# Patient Record
Sex: FEMALE | Race: WHITE | NOT HISPANIC OR LATINO | Employment: OTHER | URBAN - METROPOLITAN AREA
[De-identification: names, ages, dates, MRNs, and addresses within clinical notes are randomized per-mention and may not be internally consistent; named-entity substitution may affect disease eponyms.]

---

## 2020-12-17 ENCOUNTER — APPOINTMENT (OUTPATIENT)
Dept: CT IMAGING | Facility: HOSPITAL | Age: 55
End: 2020-12-17

## 2020-12-17 ENCOUNTER — APPOINTMENT (OUTPATIENT)
Dept: GENERAL RADIOLOGY | Facility: HOSPITAL | Age: 55
End: 2020-12-17

## 2020-12-17 ENCOUNTER — HOSPITAL ENCOUNTER (EMERGENCY)
Facility: HOSPITAL | Age: 55
Discharge: NURSING, REHAB OR LONG-TERM CARE | End: 2020-12-17
Attending: EMERGENCY MEDICINE | Admitting: EMERGENCY MEDICINE

## 2020-12-17 VITALS
SYSTOLIC BLOOD PRESSURE: 119 MMHG | HEART RATE: 86 BPM | WEIGHT: 150 LBS | TEMPERATURE: 97.6 F | HEIGHT: 63 IN | OXYGEN SATURATION: 96 % | RESPIRATION RATE: 18 BRPM | BODY MASS INDEX: 26.58 KG/M2 | DIASTOLIC BLOOD PRESSURE: 73 MMHG

## 2020-12-17 DIAGNOSIS — R41.82 ALTERED MENTAL STATUS, UNSPECIFIED ALTERED MENTAL STATUS TYPE: ICD-10-CM

## 2020-12-17 DIAGNOSIS — R73.9 HYPERGLYCEMIA: Primary | ICD-10-CM

## 2020-12-17 LAB
ALBUMIN SERPL-MCNC: 3.5 G/DL (ref 3.5–5.2)
ALBUMIN/GLOB SERPL: 1 G/DL
ALP SERPL-CCNC: 118 U/L (ref 39–117)
ALT SERPL W P-5'-P-CCNC: 17 U/L (ref 1–33)
ANION GAP SERPL CALCULATED.3IONS-SCNC: 15.8 MMOL/L (ref 5–15)
AST SERPL-CCNC: 14 U/L (ref 1–32)
BACTERIA UR QL AUTO: NORMAL /HPF
BASOPHILS # BLD AUTO: 0.04 10*3/MM3 (ref 0–0.2)
BASOPHILS NFR BLD AUTO: 0.5 % (ref 0–1.5)
BILIRUB SERPL-MCNC: 0.2 MG/DL (ref 0–1.2)
BILIRUB UR QL STRIP: NEGATIVE
BUN SERPL-MCNC: 16 MG/DL (ref 6–20)
BUN/CREAT SERPL: 29.6 (ref 7–25)
CALCIUM SPEC-SCNC: 9.2 MG/DL (ref 8.6–10.5)
CHLORIDE SERPL-SCNC: 104 MMOL/L (ref 98–107)
CLARITY UR: ABNORMAL
CO2 SERPL-SCNC: 21.2 MMOL/L (ref 22–29)
COLOR UR: YELLOW
CREAT SERPL-MCNC: 0.54 MG/DL (ref 0.57–1)
DEPRECATED RDW RBC AUTO: 44.6 FL (ref 37–54)
EOSINOPHIL # BLD AUTO: 0.1 10*3/MM3 (ref 0–0.4)
EOSINOPHIL NFR BLD AUTO: 1.1 % (ref 0.3–6.2)
ERYTHROCYTE [DISTWIDTH] IN BLOOD BY AUTOMATED COUNT: 14.1 % (ref 12.3–15.4)
GFR SERPL CREATININE-BSD FRML MDRD: 118 ML/MIN/1.73
GLOBULIN UR ELPH-MCNC: 3.6 GM/DL
GLUCOSE SERPL-MCNC: 299 MG/DL (ref 65–99)
GLUCOSE UR STRIP-MCNC: ABNORMAL MG/DL
GRAN CASTS URNS QL MICRO: NORMAL /LPF
HCT VFR BLD AUTO: 40 % (ref 34–46.6)
HGB BLD-MCNC: 13 G/DL (ref 12–15.9)
HGB UR QL STRIP.AUTO: NEGATIVE
HOLD SPECIMEN: NORMAL
HOLD SPECIMEN: NORMAL
HYALINE CASTS UR QL AUTO: NORMAL /LPF
IMM GRANULOCYTES # BLD AUTO: 0.03 10*3/MM3 (ref 0–0.05)
IMM GRANULOCYTES NFR BLD AUTO: 0.3 % (ref 0–0.5)
KETONES UR QL STRIP: ABNORMAL
LEUKOCYTE ESTERASE UR QL STRIP.AUTO: NEGATIVE
LYMPHOCYTES # BLD AUTO: 1.98 10*3/MM3 (ref 0.7–3.1)
LYMPHOCYTES NFR BLD AUTO: 22.3 % (ref 19.6–45.3)
MAGNESIUM SERPL-MCNC: 2.2 MG/DL (ref 1.6–2.6)
MCH RBC QN AUTO: 28.3 PG (ref 26.6–33)
MCHC RBC AUTO-ENTMCNC: 32.5 G/DL (ref 31.5–35.7)
MCV RBC AUTO: 87 FL (ref 79–97)
MONOCYTES # BLD AUTO: 0.43 10*3/MM3 (ref 0.1–0.9)
MONOCYTES NFR BLD AUTO: 4.9 % (ref 5–12)
NEUTROPHILS NFR BLD AUTO: 6.28 10*3/MM3 (ref 1.7–7)
NEUTROPHILS NFR BLD AUTO: 70.9 % (ref 42.7–76)
NITRITE UR QL STRIP: NEGATIVE
NRBC BLD AUTO-RTO: 0 /100 WBC (ref 0–0.2)
PH UR STRIP.AUTO: 5.5 [PH] (ref 5–8)
PLATELET # BLD AUTO: 444 10*3/MM3 (ref 140–450)
PMV BLD AUTO: 10.2 FL (ref 6–12)
POTASSIUM SERPL-SCNC: 3.7 MMOL/L (ref 3.5–5.2)
PROCALCITONIN SERPL-MCNC: 0.05 NG/ML (ref 0–0.25)
PROT SERPL-MCNC: 7.1 G/DL (ref 6–8.5)
PROT UR QL STRIP: ABNORMAL
QT INTERVAL: 372 MS
RBC # BLD AUTO: 4.6 10*6/MM3 (ref 3.77–5.28)
RBC # UR: NORMAL /HPF
REF LAB TEST METHOD: NORMAL
SODIUM SERPL-SCNC: 141 MMOL/L (ref 136–145)
SP GR UR STRIP: >=1.03 (ref 1–1.03)
SQUAMOUS #/AREA URNS HPF: NORMAL /HPF
TROPONIN T SERPL-MCNC: <0.01 NG/ML (ref 0–0.03)
UROBILINOGEN UR QL STRIP: ABNORMAL
WBC # BLD AUTO: 8.86 10*3/MM3 (ref 3.4–10.8)
WBC UR QL AUTO: NORMAL /HPF
WHOLE BLOOD HOLD SPECIMEN: NORMAL
WHOLE BLOOD HOLD SPECIMEN: NORMAL

## 2020-12-17 PROCEDURE — 93005 ELECTROCARDIOGRAM TRACING: CPT | Performed by: EMERGENCY MEDICINE

## 2020-12-17 PROCEDURE — 72125 CT NECK SPINE W/O DYE: CPT

## 2020-12-17 PROCEDURE — 70450 CT HEAD/BRAIN W/O DYE: CPT

## 2020-12-17 PROCEDURE — 93010 ELECTROCARDIOGRAM REPORT: CPT | Performed by: INTERNAL MEDICINE

## 2020-12-17 PROCEDURE — 85025 COMPLETE CBC W/AUTO DIFF WBC: CPT | Performed by: EMERGENCY MEDICINE

## 2020-12-17 PROCEDURE — 84484 ASSAY OF TROPONIN QUANT: CPT | Performed by: EMERGENCY MEDICINE

## 2020-12-17 PROCEDURE — 71045 X-RAY EXAM CHEST 1 VIEW: CPT

## 2020-12-17 PROCEDURE — P9612 CATHETERIZE FOR URINE SPEC: HCPCS

## 2020-12-17 PROCEDURE — 84145 PROCALCITONIN (PCT): CPT | Performed by: EMERGENCY MEDICINE

## 2020-12-17 PROCEDURE — 99285 EMERGENCY DEPT VISIT HI MDM: CPT

## 2020-12-17 PROCEDURE — 96360 HYDRATION IV INFUSION INIT: CPT

## 2020-12-17 PROCEDURE — 83735 ASSAY OF MAGNESIUM: CPT | Performed by: EMERGENCY MEDICINE

## 2020-12-17 PROCEDURE — 96361 HYDRATE IV INFUSION ADD-ON: CPT

## 2020-12-17 PROCEDURE — 81001 URINALYSIS AUTO W/SCOPE: CPT | Performed by: EMERGENCY MEDICINE

## 2020-12-17 PROCEDURE — 80053 COMPREHEN METABOLIC PANEL: CPT | Performed by: EMERGENCY MEDICINE

## 2020-12-17 RX ORDER — LOPERAMIDE HYDROCHLORIDE 2 MG/1
4 CAPSULE ORAL AS NEEDED
Status: ON HOLD | COMMUNITY
End: 2021-03-14

## 2020-12-17 RX ORDER — ACETAMINOPHEN 160 MG/5ML
650 SOLUTION ORAL EVERY 8 HOURS PRN
Status: ON HOLD | COMMUNITY
End: 2021-03-14

## 2020-12-17 RX ORDER — INSULIN GLARGINE 100 [IU]/ML
35 INJECTION, SOLUTION SUBCUTANEOUS 2 TIMES DAILY
COMMUNITY

## 2020-12-17 RX ORDER — CEPHALEXIN 250 MG/1
250 CAPSULE ORAL 3 TIMES DAILY
COMMUNITY
Start: 2020-12-16 | End: 2020-12-22

## 2020-12-17 RX ORDER — BROMOCRIPTINE MESYLATE 5 MG/1
15 CAPSULE ORAL 2 TIMES DAILY
Status: ON HOLD | COMMUNITY
End: 2021-03-14

## 2020-12-17 RX ORDER — CHLORHEXIDINE GLUCONATE 0.12 MG/ML
15 RINSE ORAL 2 TIMES DAILY
COMMUNITY

## 2020-12-17 RX ORDER — BACLOFEN 20 MG/1
10 TABLET ORAL 4 TIMES DAILY
COMMUNITY

## 2020-12-17 RX ORDER — ESCITALOPRAM OXALATE 20 MG/1
20 TABLET ORAL DAILY
COMMUNITY

## 2020-12-17 RX ORDER — SODIUM CHLORIDE 0.9 % (FLUSH) 0.9 %
10 SYRINGE (ML) INJECTION AS NEEDED
Status: DISCONTINUED | OUTPATIENT
Start: 2020-12-17 | End: 2020-12-17 | Stop reason: HOSPADM

## 2020-12-17 RX ORDER — FAMOTIDINE 20 MG/1
20 TABLET, FILM COATED ORAL EVERY MORNING
COMMUNITY

## 2020-12-17 RX ORDER — DIMETHICONE 1 G/100ML
LOTION TOPICAL 2 TIMES DAILY PRN
COMMUNITY

## 2020-12-17 RX ADMIN — SODIUM CHLORIDE, POTASSIUM CHLORIDE, SODIUM LACTATE AND CALCIUM CHLORIDE 1000 ML: 600; 310; 30; 20 INJECTION, SOLUTION INTRAVENOUS at 15:25

## 2020-12-17 NOTE — ED NOTES
Pt presents to ED via EMS from [facility]. Facility report she had a shut placed for hydrocephalus last month, and has had an increase in lethergy and weakness since the surgery. Pt baseline mental status A&Ox2, does not ambulate, and in a mask at this time.      Uriel Carlton, RN  12/17/20 8280

## 2020-12-17 NOTE — PROGRESS NOTES
Clinical Pharmacy Services: Medication History    Charu Schroeder is a 55 y.o. female presenting to [facility] for   Chief Complaint   Patient presents with   • Weakness - Generalized       She  has no past medical history on file.    Allergies as of 12/17/2020   • (No Known Allergies)       Medication information was obtained from: longterm paperwork  Pharmacy and Phone Number:     Prior to Admission Medications     Prescriptions Last Dose Informant Patient Reported? Taking?    acetaminophen (TYLENOL) 160 MG/5ML solution  Nursing Home Yes Yes    650 mg by Per G Tube route Every 8 (Eight) Hours As Needed for Mild Pain .    amantadine (SYMMETREL) 50 MG/5ML solution  Nursing Home Yes Yes    100 mg by Per G Tube route Every 12 (Twelve) Hours.    baclofen (LIORESAL) 20 MG tablet  Nursing Home Yes Yes    20 mg by Per G Tube route 3 (Three) Times a Day.    bromocriptine (PARLODEL) 5 MG capsule  Nursing Home Yes Yes    15 mg by Per G Tube route 2 (two) times a day.    cephalexin (KEFLEX) 250 MG capsule  Nursing Home Yes Yes    250 mg by Per G Tube route 3 (Three) Times a Day.    chlorhexidine (PERIDEX) 0.12 % solution  Nursing Home Yes Yes    Apply 15 mL to the mouth or throat 2 (Two) Times a Day.    dimethicone (Remedy Nutrashield) 1 % cream  Nursing Home Yes Yes    Apply  topically to the appropriate area as directed 2 (Two) Times a Day As Needed for Dry Skin. Apply to buttock    escitalopram (LEXAPRO) 20 MG tablet  Nursing Home Yes Yes    20 mg by Per G Tube route Daily.    famotidine (PEPCID) 20 MG tablet  Nursing Home Yes Yes    20 mg by Per G Tube route Every Morning.    insulin aspart (novoLOG FLEXPEN) 100 UNIT/ML solution pen-injector sc pen  Nursing Home Yes Yes    Inject 0-18 Units under the skin into the appropriate area as directed 4 (Four) Times a Day Before Meals & at Bedtime. For 150-200 (3 units), 201-250 (6 units), 251-300 (9 units), 301-350 (12 units), 351-400 (15 units), 401-450 (18  units) over 450 call MD    Insulin Glargine (BASAGLAR KWIKPEN) 100 UNIT/ML injection pen  Nursing Home Yes Yes    Inject 35 Units under the skin into the appropriate area as directed 2 (Two) Times a Day.    loperamide (IMODIUM) 2 MG capsule  Nursing Home Yes Yes    4 mg by Per G Tube route As Needed for Diarrhea. Maximum six capsules per day    metFORMIN (GLUCOPHAGE) 1000 MG tablet  Nursing Home Yes Yes    1,000 mg by Per G Tube route 2 (Two) Times a Day With Meals.    metoprolol tartrate (LOPRESSOR) 25 MG tablet  Nursing Home Yes Yes    6.25 mg by Per G Tube route 2 (Two) Times a Day.    miconazole (MICOTIN) 2 % powder  Nursing Home Yes Yes    Apply  topically to the appropriate area as directed 2 (two) times a day. Apply to groin and under bilateral breast    mupirocin (BACTROBAN) 2 % ointment  Nursing Home Yes Yes    Apply  topically to the appropriate area as directed 2 (Two) Times a Day. Apply to top of right hand and between fingers            Medication notes:     This medication list is complete to the best of my knowledge as of 12/17/2020    Please call if questions.    Keara Bell [facility]  Medication History Technician  [phone]    12/17/2020 18:03 EST

## 2020-12-17 NOTE — ED PROVIDER NOTES
EMERGENCY DEPARTMENT ENCOUNTER    Room Number:  15/15  Date of encounter:  12/17/2020  PCP: Germán Freire MD  Historian: Nursing home nurse and nursing home records    I used full protective equipment while examining this patient.  This includes face mask, gloves and protective eyewear.  I washed my hands before entering the room and immediately upon leaving the room.  Patient was wearing a surgical mask.      HPI:  Chief Complaint: Altered mental status  A complete HPI/ROS/PMH/PSH/SH/FH are unobtainable due to: Altered mental status    Context: Charu Schroeder is a 55 y.o. female who presents to the ED from [facility] with reports of altered mental status for the past several days.  Patient has reportedly had increased confusion and increased lethargy.  Judi, the patient's nurse at the rehab facility, states that the patient had a  shunt placed within the past month.  She believes this was done at a hospital in [place].  She states that the doctor at the facility wanted the patient sent to the ED for evaluation.  There is no history of recent fever, cough, or vomiting.  History is limited secondary to altered mental status.      PAST MEDICAL HISTORY  Active Ambulatory Problems     Diagnosis Date Noted   • No Active Ambulatory Problems     Resolved Ambulatory Problems     Diagnosis Date Noted   • No Resolved Ambulatory Problems     No Additional Past Medical History         PAST SURGICAL HISTORY  History reviewed. No pertinent surgical history.      FAMILY HISTORY  No family history on file.      SOCIAL HISTORY  Social History     Socioeconomic History   • Marital status:      Spouse name: Not on file   • Number of children: Not on file   • Years of education: Not on file   • Highest education level: Not on file         ALLERGIES  Patient has no known allergies.       REVIEW OF SYSTEMS  Review of Systems   Unobtainable      PHYSICAL EXAM    I have reviewed the triage vital signs  and nursing notes.    ED Triage Vitals [12/17/20 1418]   Temp Heart Rate Resp BP SpO2   97.6 °F (36.4 °C) 81 16 137/79 98 %      Temp src Heart Rate Source Patient Position BP Location FiO2 (%)   -- Monitor Sitting Right arm --       Physical Exam  GENERAL: Eyes open, will answer questions or follow commands  HENT: NCAT, nares patent, dry mucous membranes  NECK: supple  EYES: no scleral icterus  CV: regular rhythm, regular rate, no murmur  RESPIRATORY: normal effort, clear to auscultation bilaterally  ABDOMEN: soft, nontender, G-tube in place  MUSCULOSKELETAL: Contractures of the right arm  NEURO: Right hemiplegia, withdraws left arm to painful stimuli  SKIN: warm, dry, no rash        LAB RESULTS  Recent Results (from the past 24 hour(s))   ECG 12 Lead    Collection Time: 12/17/20  3:17 PM   Result Value Ref Range    QT Interval 372 ms   Comprehensive Metabolic Panel    Collection Time: 12/17/20  3:24 PM    Specimen: Blood   Result Value Ref Range    Glucose 299 (H) 65 - 99 mg/dL    BUN 16 6 - 20 mg/dL    Creatinine 0.54 (L) 0.57 - 1.00 mg/dL    Sodium 141 136 - 145 mmol/L    Potassium 3.7 3.5 - 5.2 mmol/L    Chloride 104 98 - 107 mmol/L    CO2 21.2 (L) 22.0 - 29.0 mmol/L    Calcium 9.2 8.6 - 10.5 mg/dL    Total Protein 7.1 6.0 - 8.5 g/dL    Albumin 3.50 3.50 - 5.20 g/dL    ALT (SGPT) 17 1 - 33 U/L    AST (SGOT) 14 1 - 32 U/L    Alkaline Phosphatase 118 (H) 39 - 117 U/L    Total Bilirubin 0.2 0.0 - 1.2 mg/dL    eGFR Non African Amer 118 >60 mL/min/1.73    Globulin 3.6 gm/dL    A/G Ratio 1.0 g/dL    BUN/Creatinine Ratio 29.6 (H) 7.0 - 25.0    Anion Gap 15.8 (H) 5.0 - 15.0 mmol/L   Troponin    Collection Time: 12/17/20  3:24 PM    Specimen: Blood   Result Value Ref Range    Troponin T <0.010 0.000 - 0.030 ng/mL   Magnesium    Collection Time: 12/17/20  3:24 PM    Specimen: Blood   Result Value Ref Range    Magnesium 2.2 1.6 - 2.6 mg/dL   Light Blue Top    Collection Time: 12/17/20  3:24 PM   Result Value Ref Range     Extra Tube hold for add-on    Green Top (Gel)    Collection Time: 12/17/20  3:24 PM   Result Value Ref Range    Extra Tube Hold for add-ons.    Lavender Top    Collection Time: 12/17/20  3:24 PM   Result Value Ref Range    Extra Tube hold for add-on    Gold Top - SST    Collection Time: 12/17/20  3:24 PM   Result Value Ref Range    Extra Tube Hold for add-ons.    CBC Auto Differential    Collection Time: 12/17/20  3:24 PM    Specimen: Blood   Result Value Ref Range    WBC 8.86 3.40 - 10.80 10*3/mm3    RBC 4.60 3.77 - 5.28 10*6/mm3    Hemoglobin 13.0 12.0 - 15.9 g/dL    Hematocrit 40.0 34.0 - 46.6 %    MCV 87.0 79.0 - 97.0 fL    MCH 28.3 26.6 - 33.0 pg    MCHC 32.5 31.5 - 35.7 g/dL    RDW 14.1 12.3 - 15.4 %    RDW-SD 44.6 37.0 - 54.0 fl    MPV 10.2 6.0 - 12.0 fL    Platelets 444 140 - 450 10*3/mm3    Neutrophil % 70.9 42.7 - 76.0 %    Lymphocyte % 22.3 19.6 - 45.3 %    Monocyte % 4.9 (L) 5.0 - 12.0 %    Eosinophil % 1.1 0.3 - 6.2 %    Basophil % 0.5 0.0 - 1.5 %    Immature Grans % 0.3 0.0 - 0.5 %    Neutrophils, Absolute 6.28 1.70 - 7.00 10*3/mm3    Lymphocytes, Absolute 1.98 0.70 - 3.10 10*3/mm3    Monocytes, Absolute 0.43 0.10 - 0.90 10*3/mm3    Eosinophils, Absolute 0.10 0.00 - 0.40 10*3/mm3    Basophils, Absolute 0.04 0.00 - 0.20 10*3/mm3    Immature Grans, Absolute 0.03 0.00 - 0.05 10*3/mm3    nRBC 0.0 0.0 - 0.2 /100 WBC   Procalcitonin    Collection Time: 12/17/20  3:24 PM    Specimen: Blood   Result Value Ref Range    Procalcitonin 0.05 0.00 - 0.25 ng/mL   Urinalysis With Microscopic If Indicated (No Culture) - Urine, Catheter    Collection Time: 12/17/20  3:56 PM    Specimen: Urine, Catheter   Result Value Ref Range    Color, UA Yellow Yellow, Straw    Appearance, UA Cloudy (A) Clear    pH, UA 5.5 5.0 - 8.0    Specific Gravity, UA >=1.030 1.005 - 1.030    Glucose, UA >=1000 mg/dL (3+) (A) Negative    Ketones, UA Trace (A) Negative    Bilirubin, UA Negative Negative    Blood, UA Negative Negative    Protein,  UA 30 mg/dL (1+) (A) Negative    Leuk Esterase, UA Negative Negative    Nitrite, UA Negative Negative    Urobilinogen, UA 1.0 E.U./dL 0.2 - 1.0 E.U./dL   Urinalysis, Microscopic Only - Urine, Catheter    Collection Time: 12/17/20  3:56 PM    Specimen: Urine, Catheter   Result Value Ref Range    RBC, UA 0-2 None Seen, 0-2 /HPF    WBC, UA 0-2 None Seen, 0-2 /HPF    Bacteria, UA None Seen None Seen /HPF    Squamous Epithelial Cells, UA None Seen None Seen, 0-2 /HPF    Hyaline Casts, UA None Seen None Seen /LPF    Granular Casts, UA 0-2 None Seen /LPF    Methodology Manual Light Microscopy        Ordered the above labs and independently reviewed the results.      RADIOLOGY  Ct Head Without Contrast    Result Date: 12/17/2020  CT HEAD WITHOUT CONTRAST  HISTORY: Altered mental status, lethargy.  COMPARISON: None.  FINDINGS: The patient's head is canted in the scanner. A small stefan hole is noted involving the frontal bone superolaterally to the right.  There is at least moderate enlargement of the lateral and third ventricles. There is no evidence of focal herniation or of a focal area of decreased attenuation to suggest acute infarction. Areas of decreased attenuation are noted involving the periventrical white matter of cerebral hemispheres bilaterally. This may represent small vessel ischemic disease and transependymal migration of fluid.  There is marked rotatory subluxation at C1-C2. This is only partially visualized.      No evidence of hemorrhage or of acute infarction. There is at least moderate enlargement of lateral and third ventricles. The fourth ventricle is not enlarged. Rotatory subluxation of C1-C2 is noted, only partially visualized.  The above information was called to and discussed with Dr. Mercedes.  CHECK CHECK recons.    Radiation dose reduction techniques were utilized, including automated exposure control and exposure modulation based on body size.       Ct Cervical Spine Without Contrast    Result  Date: 12/17/2020  CT CERVICAL SPINE WO CONTRAST-  INDICATIONS: C1-C2 rotatory subluxation  Radiation dose reduction techniques were utilized, including automated exposure control and exposure modulation based on body size.  TECHNIQUE: Noncontrast CT of the cervical spine  COMPARISON: Correlated with head CT from 12/17/2020  FINDINGS:  No acute fracture is identified.  Previously noted rotatory subluxation of C1 on C2 is redemonstrated, that may reflect ligamentous injury or degenerative ligamentous laxity of unknown chronicity, correlate with clinical presentation (if further characterization is indicated, MRI could be considered). For example, on sagittal image 94, the posterior margin of the right C1 facet is located 8.6 mm anterior to the posterior margin of the right C2 facet. Likewise, on sagittal image 125, the posterior margin of the left C1 facet located 7.0 mm posterior to the posterior margin of the left C2 facet. Mild anterolisthesis of C2 on C3. Alignment is otherwise in range of normal. The C2-C3 facets are fused bilaterally.  Facet and uncovertebral joint hypertrophy contribute to bilateral neuroforaminal narrowing, more conspicuous at C5/6, and to a lesser degree C4/5.  Disc osteophyte complex appears to result in mild central stenosis at C4/5, C5/6, C6/7.  A mildly prominent right jugulodigastric lymph node, 2.2 cm, sagittal image 26 is nonspecific, could be reactive in nature or potentially evidence of neoplasm, clinical correlation and interval follow-up recommended to characterize change; if further imaging evaluation is clinically indicated, positron emission tomography could be considered.  Small right cervical carotid arterial calcification.         As described.  This report was finalized on 12/17/2020 6:19 PM by Dr. Dylan Pena M.D.      Xr Chest 1 View    Result Date: 12/17/2020  XR CHEST 1 VW-  HISTORY: Female who is 55 years-old,  weakness  TECHNIQUE: Frontal view of the chest   COMPARISON: None available  FINDINGS: Borderline heart size. Pulmonary vasculature is unremarkable. These tortuous. Lung volume is low with small atelectasis or infiltrate at the right lung base. No focal pleural effusion or pneumothorax. No acute osseous process.      Lung volume is low with small atelectasis or infiltrate at the right lung base. Borderline heart size. Tortuous aorta.  This report was finalized on 12/17/2020 3:26 PM by Dr. Dylan Pena M.D.        I ordered the above noted radiological studies. Reviewed by me and discussed with radiologist.  See dictation for official radiology interpretation.      PROCEDURES  Procedures      MEDICATIONS GIVEN IN ER    Medications   sodium chloride 0.9 % flush 10 mL (has no administration in time range)   lactated ringers bolus 1,000 mL (0 mL Intravenous Stopped 12/17/20 1823)         PROGRESS, DATA ANALYSIS, CONSULTS, AND MEDICAL DECISION MAKING    All labs have been independently reviewed by me.  All radiology studies have been reviewed by me and discussed with radiologist dictating the report.   EKG's independently viewed and interpreted by me.  I have reviewed the nurse's notes, vital signs, past medical history, and medication list.  Discussion below represents my analysis of pertinent findings related to patient's condition, differential diagnosis, treatment plan and final disposition.      ED Course as of Dec 17 2116   Thu Dec 17, 2020   1505 Old records reviewed.  Patient has no prior admissions or ED visits here.    [WH]   1519 EKG          EKG time: 1517  Rhythm/Rate: Sinus rhythm, rate 96 (artifact present in multiple leads which limits interpretation)  P waves and MN: Normal  QRS, axis: Normal  ST and T waves: Normal    Interpreted Contemporaneously by me, independently viewed  No prior available for comparison       [WH]   1644 Results of the head CT discussed with Dr. Mallory (radiologist).  Images independently viewed by me.  There is enlargement  of the lateral and third ventricles.  There is no shunt present.  There is evidence of a prior stefan hole.  No hemorrhage.  There appears to be some rotary subluxation of the upper cervical spine.  CT of the C-spine recommended for further evaluation.    [WH]   1827 CT of the cervical spine has been interpreted by the radiologist.  I suspect these findings are chronic as there were no reports of recent trauma.    [WH]   1853 Patient is sleeping.  Vital signs are normal.  Patient's work-up is been unremarkable.  Head CT was negative acute.  She does not have UTI.  Electrolytes are normal.  Patient will be discharged back to the nursing home.    [WH]      ED Course User Index  [WH] Bautista Mercedes MD       AS OF 21:16 EST VITALS:    BP - 119/73  HR - 86  TEMP - 97.6 °F (36.4 °C)  O2 SATS - 96%      DIAGNOSIS  Final diagnoses:   Hyperglycemia   Altered mental status, unspecified altered mental status type         DISPOSITION  Discharge    Dictated utilizing Dragon dictation:  Much of this encounter note is an electronic transcription/translation of spoken language to printed text. The electronic translation of spoken language may permit erroneous, or at times, nonsensical words or phrases to be inadvertently transcribed; Although I have reviewed the note for such errors, some may still exist.     Bautista Mercedes MD  12/17/20 8519

## 2020-12-18 NOTE — DISCHARGE INSTRUCTIONS
Follow-up with your primary care doctor soon as possible.  Return to the emergency department for shortness of breath, fever, vomiting, or other concern.

## 2020-12-18 NOTE — PROGRESS NOTES
[facility] notified that patient needs to be transported back to [facility].  Awaiting call back for estimated time of transport.

## 2021-03-14 ENCOUNTER — APPOINTMENT (OUTPATIENT)
Dept: CT IMAGING | Facility: HOSPITAL | Age: 56
End: 2021-03-14

## 2021-03-14 ENCOUNTER — APPOINTMENT (OUTPATIENT)
Dept: GENERAL RADIOLOGY | Facility: HOSPITAL | Age: 56
End: 2021-03-14

## 2021-03-14 ENCOUNTER — HOSPITAL ENCOUNTER (INPATIENT)
Facility: HOSPITAL | Age: 56
LOS: 3 days | Discharge: NURSING, REHAB OR LONG-TERM CARE | End: 2021-03-17
Attending: EMERGENCY MEDICINE | Admitting: HOSPITALIST

## 2021-03-14 DIAGNOSIS — E87.0 HYPERNATREMIA: ICD-10-CM

## 2021-03-14 DIAGNOSIS — A41.9 SEPSIS, DUE TO UNSPECIFIED ORGANISM, UNSPECIFIED WHETHER ACUTE ORGAN DYSFUNCTION PRESENT (HCC): Primary | ICD-10-CM

## 2021-03-14 DIAGNOSIS — E86.0 DEHYDRATION: ICD-10-CM

## 2021-03-14 DIAGNOSIS — R09.02 HYPOXIC: ICD-10-CM

## 2021-03-14 DIAGNOSIS — R73.9 HYPERGLYCEMIA: ICD-10-CM

## 2021-03-14 DIAGNOSIS — Z87.820 HX OF TRAUMATIC BRAIN INJURY: ICD-10-CM

## 2021-03-14 PROBLEM — I10 ESSENTIAL HYPERTENSION: Status: ACTIVE | Noted: 2021-03-14

## 2021-03-14 PROBLEM — E83.41 HYPERMAGNESEMIA: Status: ACTIVE | Noted: 2021-03-14

## 2021-03-14 PROBLEM — E11.65 TYPE 2 DIABETES MELLITUS WITH HYPERGLYCEMIA (HCC): Status: ACTIVE | Noted: 2021-03-14

## 2021-03-14 PROBLEM — I69.051: Status: ACTIVE | Noted: 2021-03-14

## 2021-03-14 PROBLEM — E83.42 HYPOMAGNESEMIA: Status: ACTIVE | Noted: 2021-03-14

## 2021-03-14 PROBLEM — N17.9 AKI (ACUTE KIDNEY INJURY) (HCC): Status: ACTIVE | Noted: 2021-03-14

## 2021-03-14 LAB
ALBUMIN SERPL-MCNC: 3.5 G/DL (ref 3.5–5.2)
ALBUMIN/GLOB SERPL: 1 G/DL
ALP SERPL-CCNC: 105 U/L (ref 39–117)
ALT SERPL W P-5'-P-CCNC: 15 U/L (ref 1–33)
ANION GAP SERPL CALCULATED.3IONS-SCNC: 11.2 MMOL/L (ref 5–15)
ANION GAP SERPL CALCULATED.3IONS-SCNC: 11.5 MMOL/L (ref 5–15)
ANION GAP SERPL CALCULATED.3IONS-SCNC: 16.9 MMOL/L (ref 5–15)
AST SERPL-CCNC: 15 U/L (ref 1–32)
ATMOSPHERIC PRESS: 759.1 MMHG
B PARAPERT DNA SPEC QL NAA+PROBE: NOT DETECTED
B PERT DNA SPEC QL NAA+PROBE: NOT DETECTED
B-OH-BUTYR SERPL-SCNC: 0.55 MMOL/L (ref 0.02–0.27)
BACTERIA UR QL AUTO: ABNORMAL /HPF
BASE EXCESS BLDV CALC-SCNC: 0.7 MMOL/L (ref -2–2)
BASOPHILS # BLD AUTO: 0.05 10*3/MM3 (ref 0–0.2)
BASOPHILS NFR BLD AUTO: 0.5 % (ref 0–1.5)
BDY SITE: ABNORMAL
BILIRUB SERPL-MCNC: 0.2 MG/DL (ref 0–1.2)
BILIRUB UR QL STRIP: NEGATIVE
BUN SERPL-MCNC: 35 MG/DL (ref 6–20)
BUN SERPL-MCNC: 46 MG/DL (ref 6–20)
BUN SERPL-MCNC: 54 MG/DL (ref 6–20)
BUN/CREAT SERPL: 53.8 (ref 7–25)
BUN/CREAT SERPL: 54.5 (ref 7–25)
BUN/CREAT SERPL: 58.2 (ref 7–25)
C PNEUM DNA NPH QL NAA+NON-PROBE: NOT DETECTED
CALCIUM SPEC-SCNC: 8 MG/DL (ref 8.6–10.5)
CALCIUM SPEC-SCNC: 8.1 MG/DL (ref 8.6–10.5)
CALCIUM SPEC-SCNC: 8.7 MG/DL (ref 8.6–10.5)
CHLORIDE SERPL-SCNC: 120 MMOL/L (ref 98–107)
CHLORIDE SERPL-SCNC: 124 MMOL/L (ref 98–107)
CHLORIDE SERPL-SCNC: 126 MMOL/L (ref 98–107)
CLARITY UR: ABNORMAL
CO2 SERPL-SCNC: 25.1 MMOL/L (ref 22–29)
CO2 SERPL-SCNC: 26.5 MMOL/L (ref 22–29)
CO2 SERPL-SCNC: 26.8 MMOL/L (ref 22–29)
COLOR UR: ABNORMAL
CREAT SERPL-MCNC: 0.65 MG/DL (ref 0.57–1)
CREAT SERPL-MCNC: 0.79 MG/DL (ref 0.57–1)
CREAT SERPL-MCNC: 0.99 MG/DL (ref 0.57–1)
D-LACTATE SERPL-SCNC: 3.3 MMOL/L (ref 0.5–2)
D-LACTATE SERPL-SCNC: 4.3 MMOL/L (ref 0.5–2)
DEPRECATED RDW RBC AUTO: 56.3 FL (ref 37–54)
EOSINOPHIL # BLD AUTO: 0 10*3/MM3 (ref 0–0.4)
EOSINOPHIL NFR BLD AUTO: 0 % (ref 0.3–6.2)
ERYTHROCYTE [DISTWIDTH] IN BLOOD BY AUTOMATED COUNT: 17.4 % (ref 12.3–15.4)
FLUAV SUBTYP SPEC NAA+PROBE: NOT DETECTED
FLUBV RNA ISLT QL NAA+PROBE: NOT DETECTED
GAS FLOW AIRWAY: 3 LPM
GFR SERPL CREATININE-BSD FRML MDRD: 58 ML/MIN/1.73
GFR SERPL CREATININE-BSD FRML MDRD: 76 ML/MIN/1.73
GFR SERPL CREATININE-BSD FRML MDRD: 95 ML/MIN/1.73
GLOBULIN UR ELPH-MCNC: 3.6 GM/DL
GLUCOSE BLDC GLUCOMTR-MCNC: 311 MG/DL (ref 70–130)
GLUCOSE BLDC GLUCOMTR-MCNC: 312 MG/DL (ref 70–130)
GLUCOSE BLDC GLUCOMTR-MCNC: 440 MG/DL (ref 70–130)
GLUCOSE SERPL-MCNC: 363 MG/DL (ref 65–99)
GLUCOSE SERPL-MCNC: 368 MG/DL (ref 65–99)
GLUCOSE SERPL-MCNC: 469 MG/DL (ref 65–99)
GLUCOSE UR STRIP-MCNC: ABNORMAL MG/DL
HADV DNA SPEC NAA+PROBE: NOT DETECTED
HCO3 BLDV-SCNC: 26.2 MMOL/L (ref 22–28)
HCOV 229E RNA SPEC QL NAA+PROBE: NOT DETECTED
HCOV HKU1 RNA SPEC QL NAA+PROBE: NOT DETECTED
HCOV NL63 RNA SPEC QL NAA+PROBE: NOT DETECTED
HCOV OC43 RNA SPEC QL NAA+PROBE: NOT DETECTED
HCT VFR BLD AUTO: 51.5 % (ref 34–46.6)
HGB BLD-MCNC: 15.7 G/DL (ref 12–15.9)
HGB UR QL STRIP.AUTO: NEGATIVE
HMPV RNA NPH QL NAA+NON-PROBE: NOT DETECTED
HPIV1 RNA SPEC QL NAA+PROBE: NOT DETECTED
HPIV2 RNA SPEC QL NAA+PROBE: NOT DETECTED
HPIV3 RNA NPH QL NAA+PROBE: NOT DETECTED
HPIV4 P GENE NPH QL NAA+PROBE: NOT DETECTED
HYALINE CASTS UR QL AUTO: ABNORMAL /LPF
KETONES UR QL STRIP: ABNORMAL
LEUKOCYTE ESTERASE UR QL STRIP.AUTO: NEGATIVE
LYMPHOCYTES # BLD AUTO: 2.59 10*3/MM3 (ref 0.7–3.1)
LYMPHOCYTES NFR BLD AUTO: 26.4 % (ref 19.6–45.3)
M PNEUMO IGG SER IA-ACNC: NOT DETECTED
MAGNESIUM SERPL-MCNC: 2.6 MG/DL (ref 1.6–2.6)
MAGNESIUM SERPL-MCNC: 2.7 MG/DL (ref 1.6–2.6)
MCH RBC QN AUTO: 27.9 PG (ref 26.6–33)
MCHC RBC AUTO-ENTMCNC: 30.5 G/DL (ref 31.5–35.7)
MCV RBC AUTO: 91.5 FL (ref 79–97)
MODALITY: ABNORMAL
MONOCYTES # BLD AUTO: 0.53 10*3/MM3 (ref 0.1–0.9)
MONOCYTES NFR BLD AUTO: 5.4 % (ref 5–12)
NEUTROPHILS NFR BLD AUTO: 6.61 10*3/MM3 (ref 1.7–7)
NEUTROPHILS NFR BLD AUTO: 67.3 % (ref 42.7–76)
NITRITE UR QL STRIP: NEGATIVE
PCO2 BLDV: 44.1 MM HG (ref 41–51)
PH BLDV: 7.38 PH UNITS (ref 7.31–7.41)
PH UR STRIP.AUTO: <=5 [PH] (ref 5–8)
PLATELET # BLD AUTO: 247 10*3/MM3 (ref 140–450)
PMV BLD AUTO: 13.3 FL (ref 6–12)
PO2 BLDV: 55.9 MM HG (ref 35–45)
POTASSIUM SERPL-SCNC: 3.5 MMOL/L (ref 3.5–5.2)
POTASSIUM SERPL-SCNC: 3.7 MMOL/L (ref 3.5–5.2)
POTASSIUM SERPL-SCNC: 4.1 MMOL/L (ref 3.5–5.2)
PROCALCITONIN SERPL-MCNC: 0.13 NG/ML (ref 0–0.25)
PROT SERPL-MCNC: 7.1 G/DL (ref 6–8.5)
PROT UR QL STRIP: ABNORMAL
QT INTERVAL: 288 MS
RBC # BLD AUTO: 5.63 10*6/MM3 (ref 3.77–5.28)
RBC # UR: ABNORMAL /HPF
REF LAB TEST METHOD: ABNORMAL
RHINOVIRUS RNA SPEC NAA+PROBE: NOT DETECTED
RSV RNA NPH QL NAA+NON-PROBE: NOT DETECTED
SAO2 % BLDCOA: 87.9 % (ref 92–99)
SARS-COV-2 RNA NPH QL NAA+NON-PROBE: NOT DETECTED
SODIUM SERPL-SCNC: 162 MMOL/L (ref 136–145)
SODIUM SERPL-SCNC: 162 MMOL/L (ref 136–145)
SODIUM SERPL-SCNC: 164 MMOL/L (ref 136–145)
SP GR UR STRIP: >=1.03 (ref 1–1.03)
SQUAMOUS #/AREA URNS HPF: ABNORMAL /HPF
TOTAL RATE: 18 BREATHS/MINUTE
TROPONIN T SERPL-MCNC: 0.03 NG/ML (ref 0–0.03)
UROBILINOGEN UR QL STRIP: ABNORMAL
WBC # BLD AUTO: 9.82 10*3/MM3 (ref 3.4–10.8)
WBC UR QL AUTO: ABNORMAL /HPF
YEAST URNS QL MICRO: ABNORMAL /HPF

## 2021-03-14 PROCEDURE — 93010 ELECTROCARDIOGRAM REPORT: CPT | Performed by: INTERNAL MEDICINE

## 2021-03-14 PROCEDURE — P9612 CATHETERIZE FOR URINE SPEC: HCPCS

## 2021-03-14 PROCEDURE — 84145 PROCALCITONIN (PCT): CPT | Performed by: PHYSICIAN ASSISTANT

## 2021-03-14 PROCEDURE — 83605 ASSAY OF LACTIC ACID: CPT | Performed by: PHYSICIAN ASSISTANT

## 2021-03-14 PROCEDURE — 63710000001 INSULIN REGULAR HUMAN PER 5 UNITS: Performed by: PHYSICIAN ASSISTANT

## 2021-03-14 PROCEDURE — 82962 GLUCOSE BLOOD TEST: CPT

## 2021-03-14 PROCEDURE — 93005 ELECTROCARDIOGRAM TRACING: CPT | Performed by: PHYSICIAN ASSISTANT

## 2021-03-14 PROCEDURE — 81001 URINALYSIS AUTO W/SCOPE: CPT | Performed by: PHYSICIAN ASSISTANT

## 2021-03-14 PROCEDURE — 84484 ASSAY OF TROPONIN QUANT: CPT | Performed by: PHYSICIAN ASSISTANT

## 2021-03-14 PROCEDURE — 63710000001 INSULIN LISPRO (HUMAN) PER 5 UNITS: Performed by: HOSPITALIST

## 2021-03-14 PROCEDURE — 82803 BLOOD GASES ANY COMBINATION: CPT

## 2021-03-14 PROCEDURE — 83735 ASSAY OF MAGNESIUM: CPT | Performed by: PHYSICIAN ASSISTANT

## 2021-03-14 PROCEDURE — 82010 KETONE BODYS QUAN: CPT | Performed by: PHYSICIAN ASSISTANT

## 2021-03-14 PROCEDURE — 87070 CULTURE OTHR SPECIMN AEROBIC: CPT | Performed by: NURSE PRACTITIONER

## 2021-03-14 PROCEDURE — 85025 COMPLETE CBC W/AUTO DIFF WBC: CPT | Performed by: PHYSICIAN ASSISTANT

## 2021-03-14 PROCEDURE — 36415 COLL VENOUS BLD VENIPUNCTURE: CPT

## 2021-03-14 PROCEDURE — 71045 X-RAY EXAM CHEST 1 VIEW: CPT

## 2021-03-14 PROCEDURE — 0202U NFCT DS 22 TRGT SARS-COV-2: CPT | Performed by: EMERGENCY MEDICINE

## 2021-03-14 PROCEDURE — 83735 ASSAY OF MAGNESIUM: CPT | Performed by: NURSE PRACTITIONER

## 2021-03-14 PROCEDURE — 63710000001 INSULIN GLARGINE PER 5 UNITS: Performed by: NURSE PRACTITIONER

## 2021-03-14 PROCEDURE — 99285 EMERGENCY DEPT VISIT HI MDM: CPT

## 2021-03-14 PROCEDURE — 36415 COLL VENOUS BLD VENIPUNCTURE: CPT | Performed by: NURSE PRACTITIONER

## 2021-03-14 PROCEDURE — 70450 CT HEAD/BRAIN W/O DYE: CPT

## 2021-03-14 PROCEDURE — 87186 SC STD MICRODIL/AGAR DIL: CPT | Performed by: NURSE PRACTITIONER

## 2021-03-14 PROCEDURE — 80053 COMPREHEN METABOLIC PANEL: CPT | Performed by: NURSE PRACTITIONER

## 2021-03-14 PROCEDURE — 87205 SMEAR GRAM STAIN: CPT | Performed by: NURSE PRACTITIONER

## 2021-03-14 PROCEDURE — 87040 BLOOD CULTURE FOR BACTERIA: CPT | Performed by: PHYSICIAN ASSISTANT

## 2021-03-14 PROCEDURE — 87147 CULTURE TYPE IMMUNOLOGIC: CPT | Performed by: NURSE PRACTITIONER

## 2021-03-14 PROCEDURE — 25010000002 VANCOMYCIN 10 G RECONSTITUTED SOLUTION: Performed by: EMERGENCY MEDICINE

## 2021-03-14 PROCEDURE — 25010000002 PIPERACILLIN SOD-TAZOBACTAM PER 1 G: Performed by: EMERGENCY MEDICINE

## 2021-03-14 RX ORDER — ACETAMINOPHEN 325 MG/1
650 TABLET ORAL EVERY 4 HOURS PRN
Status: DISCONTINUED | OUTPATIENT
Start: 2021-03-14 | End: 2021-03-17 | Stop reason: HOSPADM

## 2021-03-14 RX ORDER — NITROGLYCERIN 0.4 MG/1
0.4 TABLET SUBLINGUAL
Status: DISCONTINUED | OUTPATIENT
Start: 2021-03-14 | End: 2021-03-17 | Stop reason: HOSPADM

## 2021-03-14 RX ORDER — NICOTINE POLACRILEX 4 MG
15 LOZENGE BUCCAL
Status: DISCONTINUED | OUTPATIENT
Start: 2021-03-14 | End: 2021-03-17 | Stop reason: HOSPADM

## 2021-03-14 RX ORDER — ACETAMINOPHEN 650 MG/1
650 SUPPOSITORY RECTAL ONCE
Status: COMPLETED | OUTPATIENT
Start: 2021-03-14 | End: 2021-03-14

## 2021-03-14 RX ORDER — DOCUSATE SODIUM 100 MG/1
100 CAPSULE, LIQUID FILLED ORAL 2 TIMES DAILY PRN
Status: DISCONTINUED | OUTPATIENT
Start: 2021-03-14 | End: 2021-03-17 | Stop reason: HOSPADM

## 2021-03-14 RX ORDER — CHOLECALCIFEROL (VITAMIN D3) 125 MCG
5 CAPSULE ORAL NIGHTLY PRN
Status: DISCONTINUED | OUTPATIENT
Start: 2021-03-14 | End: 2021-03-14

## 2021-03-14 RX ORDER — ESCITALOPRAM OXALATE 20 MG/1
20 TABLET ORAL DAILY
Status: DISCONTINUED | OUTPATIENT
Start: 2021-03-14 | End: 2021-03-17 | Stop reason: HOSPADM

## 2021-03-14 RX ORDER — FAMOTIDINE 20 MG/1
20 TABLET, FILM COATED ORAL EVERY MORNING
Status: DISCONTINUED | OUTPATIENT
Start: 2021-03-15 | End: 2021-03-17 | Stop reason: HOSPADM

## 2021-03-14 RX ORDER — SODIUM CHLORIDE 9 MG/ML
125 INJECTION, SOLUTION INTRAVENOUS CONTINUOUS
Status: DISCONTINUED | OUTPATIENT
Start: 2021-03-14 | End: 2021-03-14

## 2021-03-14 RX ORDER — ACETAMINOPHEN 160 MG/5ML
650 SOLUTION ORAL EVERY 4 HOURS PRN
Status: DISCONTINUED | OUTPATIENT
Start: 2021-03-14 | End: 2021-03-17 | Stop reason: HOSPADM

## 2021-03-14 RX ORDER — SODIUM CHLORIDE 9 MG/ML
100 INJECTION, SOLUTION INTRAVENOUS CONTINUOUS
Status: DISCONTINUED | OUTPATIENT
Start: 2021-03-14 | End: 2021-03-14

## 2021-03-14 RX ORDER — METHYLPHENIDATE HYDROCHLORIDE 20 MG/1
20 TABLET ORAL 2 TIMES DAILY
COMMUNITY

## 2021-03-14 RX ORDER — ACETAMINOPHEN 650 MG/1
650 SUPPOSITORY RECTAL EVERY 4 HOURS PRN
Status: DISCONTINUED | OUTPATIENT
Start: 2021-03-14 | End: 2021-03-17 | Stop reason: HOSPADM

## 2021-03-14 RX ORDER — INSULIN LISPRO 100 [IU]/ML
0-9 INJECTION, SOLUTION INTRAVENOUS; SUBCUTANEOUS
Status: DISCONTINUED | OUTPATIENT
Start: 2021-03-14 | End: 2021-03-14

## 2021-03-14 RX ORDER — INSULIN LISPRO 100 [IU]/ML
0-9 INJECTION, SOLUTION INTRAVENOUS; SUBCUTANEOUS EVERY 4 HOURS
Status: DISCONTINUED | OUTPATIENT
Start: 2021-03-14 | End: 2021-03-17 | Stop reason: HOSPADM

## 2021-03-14 RX ORDER — DEXTROSE AND SODIUM CHLORIDE 5; .45 G/100ML; G/100ML
125 INJECTION, SOLUTION INTRAVENOUS CONTINUOUS
Status: DISCONTINUED | OUTPATIENT
Start: 2021-03-14 | End: 2021-03-15

## 2021-03-14 RX ORDER — ONDANSETRON 2 MG/ML
4 INJECTION INTRAMUSCULAR; INTRAVENOUS EVERY 6 HOURS PRN
Status: DISCONTINUED | OUTPATIENT
Start: 2021-03-14 | End: 2021-03-17 | Stop reason: HOSPADM

## 2021-03-14 RX ORDER — INSULIN GLARGINE 100 [IU]/ML
35 INJECTION, SOLUTION SUBCUTANEOUS 2 TIMES DAILY
Status: DISCONTINUED | OUTPATIENT
Start: 2021-03-14 | End: 2021-03-17 | Stop reason: HOSPADM

## 2021-03-14 RX ORDER — DEXTROSE MONOHYDRATE 25 G/50ML
25 INJECTION, SOLUTION INTRAVENOUS
Status: DISCONTINUED | OUTPATIENT
Start: 2021-03-14 | End: 2021-03-17 | Stop reason: HOSPADM

## 2021-03-14 RX ORDER — SODIUM CHLORIDE 0.9 % (FLUSH) 0.9 %
10 SYRINGE (ML) INJECTION EVERY 12 HOURS SCHEDULED
Status: DISCONTINUED | OUTPATIENT
Start: 2021-03-14 | End: 2021-03-17 | Stop reason: HOSPADM

## 2021-03-14 RX ORDER — SODIUM CHLORIDE 0.9 % (FLUSH) 0.9 %
10 SYRINGE (ML) INJECTION AS NEEDED
Status: DISCONTINUED | OUTPATIENT
Start: 2021-03-14 | End: 2021-03-17 | Stop reason: HOSPADM

## 2021-03-14 RX ORDER — CALCIUM CARBONATE 200(500)MG
2 TABLET,CHEWABLE ORAL 2 TIMES DAILY PRN
Status: DISCONTINUED | OUTPATIENT
Start: 2021-03-14 | End: 2021-03-17 | Stop reason: HOSPADM

## 2021-03-14 RX ADMIN — SODIUM CHLORIDE 100 ML/HR: 9 INJECTION, SOLUTION INTRAVENOUS at 14:20

## 2021-03-14 RX ADMIN — METOPROLOL TARTRATE 6.25 MG: 25 TABLET, FILM COATED ORAL at 17:07

## 2021-03-14 RX ADMIN — ESCITALOPRAM 20 MG: 20 TABLET, FILM COATED ORAL at 17:07

## 2021-03-14 RX ADMIN — SODIUM CHLORIDE 1000 ML: 9 INJECTION, SOLUTION INTRAVENOUS at 08:58

## 2021-03-14 RX ADMIN — INSULIN LISPRO 7 UNITS: 100 INJECTION, SOLUTION INTRAVENOUS; SUBCUTANEOUS at 17:24

## 2021-03-14 RX ADMIN — SODIUM CHLORIDE 2040 ML: 9 INJECTION, SOLUTION INTRAVENOUS at 10:08

## 2021-03-14 RX ADMIN — INSULIN HUMAN 6 UNITS: 100 INJECTION, SOLUTION PARENTERAL at 09:11

## 2021-03-14 RX ADMIN — INSULIN LISPRO 7 UNITS: 100 INJECTION, SOLUTION INTRAVENOUS; SUBCUTANEOUS at 21:47

## 2021-03-14 RX ADMIN — TAZOBACTAM SODIUM AND PIPERACILLIN SODIUM 3.38 G: 375; 3 INJECTION, SOLUTION INTRAVENOUS at 10:10

## 2021-03-14 RX ADMIN — DEXTROSE AND SODIUM CHLORIDE 125 ML/HR: 5; 450 INJECTION, SOLUTION INTRAVENOUS at 17:06

## 2021-03-14 RX ADMIN — ACETAMINOPHEN ORAL SOLUTION 650 MG: 325 SOLUTION ORAL at 17:06

## 2021-03-14 RX ADMIN — VANCOMYCIN HYDROCHLORIDE 1250 MG: 10 INJECTION, POWDER, LYOPHILIZED, FOR SOLUTION INTRAVENOUS at 10:52

## 2021-03-14 RX ADMIN — SODIUM CHLORIDE, PRESERVATIVE FREE 10 ML: 5 INJECTION INTRAVENOUS at 20:37

## 2021-03-14 RX ADMIN — INSULIN GLARGINE 35 UNITS: 100 INJECTION, SOLUTION SUBCUTANEOUS at 21:47

## 2021-03-14 RX ADMIN — ACETAMINOPHEN 650 MG: 650 SUPPOSITORY RECTAL at 08:10

## 2021-03-14 RX ADMIN — AMANTADINE HYDROCHLORIDE 100 MG: 50 SOLUTION ORAL at 17:07

## 2021-03-14 RX ADMIN — METOPROLOL TARTRATE 6.25 MG: 25 TABLET, FILM COATED ORAL at 20:37

## 2021-03-14 RX ADMIN — SODIUM CHLORIDE, PRESERVATIVE FREE 10 ML: 5 INJECTION INTRAVENOUS at 17:07

## 2021-03-14 NOTE — ED PROVIDER NOTES
MD ATTESTATION NOTE  Patient was placed in face mask in first look and the following protective measures were taken unless additional measures were taken and documented below in the ED course. Patient was wearing facemask when I entered the room and throughout our encounter. I wore full protective equipment throughout this patient encounter including a face mask, and gloves. Hand hygiene was performed before donning protective equipment and after removal when leaving the room.    The TONY and I have discussed this patient's history, physical exam, and treatment plan. I have reviewed the documentation and personally had a face to face interaction with the patient. I affirm the TONY documentation and agree with their diagnostics, findings, treatment, plan, and disposition.  The attached note describes my personal findings.    Charu Schroeder is a 55 y.o. female who presents to the ED c/o fever tachycardia and tachypnea.  Patient has a history of subarachnoid hemorrhage, nonverbal at baseline.  History limited.  Patient sent from [place] ED secondary to abnormal vital signs, concern for infection.    On exam:  General: NAD  Head: NCAT  ENT: Extraocular motion intact, pupils equal and round reactive to light, moist mucous membranes  Neck: Supple, trachea midline  Cardiac: Tachycardic rate, regular rhythm  Lungs: Tachypneic, no accessory muscle use, crackles bilateral lung bases  Abdomen: Soft, nontender, no rebound tenderness/guarding/rigidity  : Deferred to TONY  Extremities: Moves all extremities well, no peripheral edema  Skin: Warm, dry    Medical Decision Making:  After the initial H&P, I discussed pertinent information from history and physical exam with patient/family.  Discussed differential diagnosis.  Discussed plan for ED evaluation/work-up/treatment.  All questions answered.  Patient/family is agreeable with plan.    ED Course as of Mar 14 1515   Sun Mar 14, 2021   4731 I wore full protective equipment  throughout this patient encounter including a CAPPR, gown and gloves. Hand hygiene was performed before donning protective equipment and after removal when leaving the room.        [JG]   0841 EKG independently viewed and contemporaneously interpreted by ED physician. Time: 8:24 AM.  Rate 136.  Interpretation: Sinus tachycardia, normal axis, normal QRS, no acute ST changes.    [JG]   0857 Corrected Sodium is 171.    [SS]   0907 I attempted to call [facility] 3x regarding the patient's CODE STATUS.  No answer.  We will try again later.    [SS]   0931 Febrile, lactic acid 4.3.  Initiating 30 cc/kg fluid bolus, will obtain repeat lactic acid.  Blood cultures obtained.  Chest x-ray unremarkable, urinalysis not consistent with infection.  No clear identified source.  Respiratory viral panel is currently pending.  Initiating broad-spectrum antibiotics for sepsis.    [JG]   1003 I had a lengthy discussion with Dr. Wilkinson regarding patient.  He reports that if  blood pressure is normal, I notified him of the hypernatremia, he advised to call medicine and will consult.    [SS]   1057 I discussed with Dr. Manley [facility] regarding patient.  He agrees to admit patient to telemetry and requests not to give any further antibiotics. RN had just started the Vancomycin but was discontinued.     [SS]   1103 I rechecked patient.  Blood pressure has remained stable and is 112/83 the patient is still tachycardic in the 120s.  I have admitted to a telemetry floor for further monitoring.    [SS]      ED Course User Index  [JG] Sim Hanson MD  [SS] Mary Lyon PA-C       Diagnosis  Final diagnoses:   Sepsis, due to unspecified organism, unspecified whether acute organ dysfunction present (CMS/[facility])   Dehydration   Hyperglycemia   Hx of traumatic brain injury   Hypoxic        Sim Hanson MD  03/14/21 8070

## 2021-03-14 NOTE — ED NOTES
All appropriate PPE worn during entire encounter with patient.        Aicha Mcgill, RN  03/14/21 0902

## 2021-03-14 NOTE — H&P
Patient Name:  Charu Schroeder  YOB: 1965  MRN:  [id number]  Admit Date:  3/14/2021  Patient Care Team:  Germán Freire MD as PCP - General (Physical Medicine and Rehabilitation)      Subjective   History Present Illness     Chief Complaint   Patient presents with   • Hyperglycemia       Ms. Schroeder is a 55 y.o. female with a history of traumatic subarachnoid hemorrhage and diabetes that presents to [facility] from [facility] for hyperglycemia, fever, tachypnea which began last night.  Blood sugar on arrival to the ED was greater than 400.  Patient's baseline is aphasic secondary to traumatic subarachnoid hemorrhage.  Apparently the patient has had a heart rate in the 150s with an elevated respiratory rate of 30 and was hypoxic on room air with sats in the 80s.  Staff at [facility] deny any recent Covid exposure in the facility.       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 No pertinent family history.  Social History     Tobacco Use   • Smoking status: Unknown If Ever Smoked   Substance Use Topics   • Alcohol use: Defer   • Drug use: Defer     No current facility-administered medications on  file prior to encounter.     Current Outpatient Medications on File Prior to Encounter   Medication Sig Dispense Refill   • amantadine (SYMMETREL) 50 MG/5ML solution 100 mg by Per G Tube route Every 12 (Twelve) Hours.     • baclofen (LIORESAL) 20 MG tablet 20 mg by Per G Tube route 3 (Three) Times a Day.     • chlorhexidine (PERIDEX) 0.12 % solution Apply 15 mL to the mouth or throat 2 (Two) Times a Day.     • dimethicone (Remedy Nutrashield) 1 % cream Apply  topically to the appropriate area as directed 2 (Two) Times a Day As Needed for Dry Skin. Apply to buttock     • escitalopram (LEXAPRO) 20 MG tablet 20 mg by Per G Tube route Daily.     • famotidine (PEPCID) 20 MG tablet 20 mg by Per G Tube route Every Morning.     • insulin aspart (novoLOG FLEXPEN) 100 UNIT/ML solution pen-injector sc pen Inject 0-18 Units under the skin into the appropriate area as directed 4 (Four) Times a Day Before Meals & at Bedtime. For 150-200 (3 units), 201-250 (6 units), 251-300 (9 units), 301-350 (12 units), 351-400 (15 units), 401-450 (18 units) over 450 call MD     • Insulin Glargine (BASAGLAR KWIKPEN) 100 UNIT/ML injection pen Inject 35 Units under the skin into the appropriate area as directed 2 (Two) Times a Day.     • metFORMIN (GLUCOPHAGE) 1000 MG tablet 1,000 mg by Per G Tube route 2 (Two) Times a Day With Meals.     • methylphenidate (RITALIN) 20 MG tablet 20 mg by Per G Tube route 2 (Two) Times a Day.     • metoprolol tartrate (LOPRESSOR) 25 MG tablet 6.25 mg by Per G Tube route 2 (Two) Times a Day.     • miconazole (MICOTIN) 2 % powder Apply  topically to the appropriate area as directed 2 (two) times a day. Apply to groin and under bilateral breast     • mupirocin (BACTROBAN) 2 % ointment Apply  topically to the appropriate area as directed 2 (Two) Times a Day. Apply to top of right hand and between fingers     • acetaminophen (TYLENOL) 160 MG/5ML solution 650 mg by Per G Tube route Every 8 (Eight) Hours As Needed for  Mild Pain .     • bromocriptine (PARLODEL) 5 MG capsule 15 mg by Per G Tube route 2 (two) times a day.     • loperamide (IMODIUM) 2 MG capsule 4 mg by Per G Tube route As Needed for Diarrhea. Maximum six capsules per day       No Known Allergies    Objective    Objective     Vital Signs  Temp:  [100 °F (37.8 °C)-101.2 °F (38.4 °C)] 100 °F (37.8 °C)  Heart Rate:  [130-140] 130  Resp:  [25-32] 25  BP: (112-124)/(66-85) 124/78  SpO2:  [90 %-94 %] 94 %  on  Flow (L/min):  [2] 2;   Device (Oxygen Therapy): nasal cannula  Body mass index is 26.57 kg/m².    Physical Exam  Constitutional:       Appearance: She is ill-appearing.   HENT:      Head: Normocephalic and atraumatic.      Nose: Nose normal.      Mouth/Throat:      Mouth: Mucous membranes are dry.   Eyes:      General:         Right eye: No discharge.         Left eye: No discharge.   Cardiovascular:      Rate and Rhythm: Tachycardia present.      Heart sounds: Normal heart sounds. No murmur.      Comments: Plus 2 pedal pulses  Pulmonary:      Comments: 2 liters NC with sats 93%  Abdominal:      General: Bowel sounds are normal.      Palpations: Abdomen is soft. There is no mass.      Comments: G tube site with thick brown drainage   Musculoskeletal:         General: Swelling (trace pedal edema) present.      Cervical back: Neck supple.   Skin:     General: Skin is warm and dry.      Coloration: Skin is pale.      Comments: Thin shiny skin on LE   Neurological:      GCS: GCS eye subscore is 1. GCS verbal subscore is 1. GCS motor subscore is 5.         Results Review:  I reviewed the patient's new clinical results.  I reviewed the patient's new imaging results and agree with the interpretation.  I reviewed the patient's other test results and agree with the interpretation  I have discussed the case with the ED provider.    Lab Results (last 24 hours)     Procedure Component Value Units Date/Time    CBC & Differential [[id number]]  (Abnormal) Collected: 03/14/21 0809     Specimen: Blood Updated: 03/14/21 0840    Narrative:      The following orders were created for panel order CBC & Differential.  Procedure                               Abnormality         Status                     ---------                               -----------         ------                     CBC Auto Differential[[id number]]        Abnormal            Final result                 Please view results for these tests on the individual orders.    Comprehensive Metabolic Panel [[id number]]  (Abnormal) Collected: 03/14/21 0809    Specimen: Blood Updated: 03/14/21 0857     Glucose 469 mg/dL      BUN 54 mg/dL      Creatinine 0.99 mg/dL      Sodium 162 mmol/L      Potassium 4.1 mmol/L      Chloride 120 mmol/L      CO2 25.1 mmol/L      Calcium 8.7 mg/dL      Total Protein 7.1 g/dL      Albumin 3.50 g/dL      ALT (SGPT) 15 U/L      AST (SGOT) 15 U/L      Alkaline Phosphatase 105 U/L      Total Bilirubin 0.2 mg/dL      eGFR Non African Amer 58 mL/min/1.73      Globulin 3.6 gm/dL      A/G Ratio 1.0 g/dL      BUN/Creatinine Ratio 54.5     Anion Gap 16.9 mmol/L     Narrative:      GFR Normal >60  Chronic Kidney Disease <60  Kidney Failure <15      Blood Culture - Blood, Blood, Venous Line [[id number]] Collected: 03/14/21 0809    Specimen: Blood, Venous Line Updated: 03/14/21 0818    Lactic Acid, Plasma [[id number]]  (Abnormal) Collected: 03/14/21 0809    Specimen: Blood Updated: 03/14/21 0842     Lactate 4.3 mmol/L     Procalcitonin [[id number]]  (Normal) Collected: 03/14/21 0809    Specimen: Blood Updated: 03/14/21 0849     Procalcitonin 0.13 ng/mL     Narrative:      As a Marker for Sepsis (Non-Neonates):   1. <0.5 ng/mL represents a low risk of severe sepsis and/or septic shock.  1. >2 ng/mL represents a high risk of severe sepsis and/or septic shock.    As a Marker for Lower Respiratory Tract Infections that require antibiotic therapy:  PCT on Admission     Antibiotic Therapy             6-12 Hrs later  > 0.5           "      Strongly Recommended            >0.25 - <0.5         Recommended  0.1 - 0.25           Discouraged                   Remeasure/reassess PCT  <0.1                 Strongly Discouraged          Remeasure/reassess PCT      As 28 day mortality risk marker: \"Change in Procalcitonin Result\" (> 80 % or <=80 %) if Day 0 (or Day 1) and Day 4 values are available. Refer to http://[email or web address]ano[facility]ordpct-calculator.com/   Change in PCT <=80 %   A decrease of PCT levels below or equal to 80 % defines a positive change in PCT test result representing a higher risk for 28-day all-cause mortality of patients diagnosed with severe sepsis or septic shock.  Change in PCT > 80 %   A decrease of PCT levels of more than 80 % defines a negative change in PCT result representing a lower risk for 28-day all-cause mortality of patients diagnosed with severe sepsis or septic shock.                Results may be falsely decreased if patient taking Biotin.     Magnesium [[id number]]  (Abnormal) Collected: 03/14/21 0809    Specimen: Blood Updated: 03/14/21 0844     Magnesium 2.7 mg/dL     CBC Auto Differential [[id number]]  (Abnormal) Collected: 03/14/21 0809    Specimen: Blood Updated: 03/14/21 0840     WBC 9.82 10*3/mm3      RBC 5.63 10*6/mm3      Hemoglobin 15.7 g/dL      Hematocrit 51.5 %      MCV 91.5 fL      MCH 27.9 pg      MCHC 30.5 g/dL      RDW 17.4 %      RDW-SD 56.3 fl      MPV 13.3 fL      Platelets 247 10*3/mm3      Neutrophil % 67.3 %      Lymphocyte % 26.4 %      Monocyte % 5.4 %      Eosinophil % 0.0 %      Basophil % 0.5 %      Neutrophils, Absolute 6.61 10*3/mm3      Lymphocytes, Absolute 2.59 10*3/mm3      Monocytes, Absolute 0.53 10*3/mm3      Eosinophils, Absolute 0.00 10*3/mm3      Basophils, Absolute 0.05 10*3/mm3     Ketone Bodies, Serum (Not performed at [place]) [[id number]]  (Abnormal) Collected: 03/14/21 0809    Specimen: Blood Updated: 03/14/21 0857    Narrative:      The following orders were created for panel order " Ketone Bodies, Serum (Not performed at [place]).  Procedure                               Abnormality         Status                     ---------                               -----------         ------                     Beta Hydroxybutyrate Arnulfo...[[id number]]  Abnormal            Final result                 Please view results for these tests on the individual orders.    Beta Hydroxybutyrate Quantitative [[id number]]  (Abnormal) Collected: 03/14/21 0809    Specimen: Blood Updated: 03/14/21 0857     Beta-Hydroxybutyrate Quant 0.550 mmol/L     Narrative:      In the assessment of possible diabetic ketoacidosis, the test should be interpreted along with other clinical and laboratory findings.  A level greater than 1 mmol/L should require further evaluation and levels of more than 3 mmol/L require immediate medical review.    Troponin [[id number]]  (Abnormal) Collected: 03/14/21 0809    Specimen: Blood Updated: 03/14/21 0843     Troponin T 0.032 ng/mL     Narrative:      Troponin T Reference Range:  <= 0.03 ng/mL-   Negative for AMI  >0.03 ng/mL-     Abnormal for myocardial necrosis.  Clinicians would have to utilize clinical acumen, EKG, Troponin and serial changes to determine if it is an Acute Myocardial Infarction or myocardial injury due to an underlying chronic condition.       Results may be falsely decreased if patient taking Biotin.      Urinalysis With Microscopic If Indicated (No Culture) - Urine, Catheter [[id number]]  (Abnormal) Collected: 03/14/21 0815    Specimen: Urine, Catheter Updated: 03/14/21 0845     Color, UA Dark Yellow     Appearance, UA Cloudy     pH, UA <=5.0     Specific Gravity, UA >=1.030     Glucose,  mg/dL (2+)     Ketones, UA 15 mg/dL (1+)     Bilirubin, UA Negative     Blood, UA Negative     Protein,  mg/dL (2+)     Leuk Esterase, UA Negative     Nitrite, UA Negative     Urobilinogen, UA 1.0 E.U./dL    Urinalysis, Microscopic Only - Urine, Catheter [[id number]]  (Abnormal)  Collected: 03/14/21 0815    Specimen: Urine, Catheter Updated: 03/14/21 0902     RBC, UA None Seen /HPF      WBC, UA None Seen /HPF      Bacteria, UA Trace /HPF      Squamous Epithelial Cells, UA 3-6 /HPF      Yeast, UA Large/3+ Budding Yeast /HPF      Hyaline Casts, UA None Seen /LPF      Methodology Manual Light Microscopy    Blood Culture - Blood, Arm, Left [[id number]] Collected: 03/14/21 0905    Specimen: Blood from Arm, Left Updated: 03/14/21 0910    Respiratory Panel PCR w/COVID-19(SARS-CoV-2) KIRSTIE/PATSY/TRIXIE/PAD/COR/MAD/RODRIGO In-House, NP Swab in UTM/VTM, 3-4 HR TAT - Swab, Nasopharynx [[id number]]  (Normal) Collected: 03/14/21 0906    Specimen: Swab from Nasopharynx Updated: 03/14/21 1009     ADENOVIRUS, PCR Not Detected     Coronavirus 229E Not Detected     Coronavirus HKU1 Not Detected     Coronavirus NL63 Not Detected     Coronavirus OC43 Not Detected     COVID19 Not Detected     Human Metapneumovirus Not Detected     Human Rhinovirus/Enterovirus Not Detected     Influenza A PCR Not Detected     Influenza B PCR Not Detected     Parainfluenza Virus 1 Not Detected     Parainfluenza Virus 2 Not Detected     Parainfluenza Virus 3 Not Detected     Parainfluenza Virus 4 Not Detected     RSV, PCR Not Detected     Bordetella pertussis pcr Not Detected     Bordetella parapertussis PCR Not Detected     Chlamydophila pneumoniae PCR Not Detected     Mycoplasma pneumo by PCR Not Detected    Narrative:      Fact sheet for providers: https://docs.Qapa/wp-content/uploads/MPK7352-2890-DN8.1-EUA-Provider-Fact-Sheet-3.pdf    Fact sheet for patients: https://docs.[employer]apa/wp-content/uploads/IVH2833-1234-PR3.1-EUA-Patient-Fact-Sheet-1.pdf    Test performed by PCR.    POC Glucose Once [[id number]]  (Abnormal) Collected: 03/14/21 1007    Specimen: Blood Updated: 03/14/21 1015     Glucose 440 mg/dL     Blood Gas, Venous - [[id number]]  (Abnormal) Collected: 03/14/21 1010    Specimen: Venous Blood Updated: 03/14/21 1013      Site N/A     pH, Venous 7.381 pH Units      pCO2, Venous 44.1 mm Hg      pO2, Venous 55.9 mm Hg      HCO3, Venous 26.2 mmol/L      Base Excess, Venous 0.7 mmol/L      O2 Saturation Calculated 87.9 %      Barometric Pressure for Blood Gas 759.1 mmHg      Modality Cannula     Flow Rate 3 lpm      Rate 18 Breaths/minute     Timed Lactic Acid, Reflex [[id number]]  (Abnormal) Collected: 03/14/21 1114    Specimen: Blood Updated: 03/14/21 1138     Lactate 3.3 mmol/L           Imaging Results (Last 24 Hours)     Procedure Component Value Units Date/Time    XR Chest 1 View [[id number]] Collected: 03/14/21 0856     Updated: 03/14/21 0900    Narrative:      XR CHEST 1 VW-     HISTORY: Female who is 55 years-old,  fever     TECHNIQUE: Frontal view of the chest     COMPARISON: 12/17/2020     FINDINGS: The heart size is borderline. Pulmonary vasculature is  unremarkable. Aorta is tortuous. No focal pulmonary consolidation,  pleural effusion, or pneumothorax. Right hemidiaphragm is mildly  elevated. Lung volume is low. No acute osseous process.       Impression:      No focal pulmonary consolidation. Borderline heart size.  Tortuous aorta. Follow-up as clinically indicated.     This report was finalized on 3/14/2021 8:57 AM by Dr. Dyaln Pena M.D.                 ECG 12 Lead   Final Result   HEART RATE= 136  bpm   RR Interval= 440  ms   IA Interval= 144  ms   P Horizontal Axis= -2  deg   P Front Axis= 49  deg   QRSD Interval= 69  ms   QT Interval= 288  ms   QRS Axis= 0  deg   T Wave Axis=   deg   - OTHERWISE NORMAL ECG -   Sinus tachycardia   No change from prior tracing   Electronically Signed By: Coco Beasley ([facility]) 14-Mar-2021 10:03:44   Date and Time of Study: 2021-03-14 08:24:55           Assessment/Plan     Active Hospital Problems    Diagnosis  POA   • **Type 2 diabetes mellitus with hyperglycemia (CMS/HCC) [E11.65]  Unknown   • Dehydration [E86.0]  Yes   • Sepsis, unspecified organism (CMS/HCC) [A41.9]  Unknown   •  ZAINAB (acute kidney injury) (CMS/HCC) [N17.9]  Unknown   • Hypernatremia [E87.0]  Unknown   • Hypermagnesemia [E83.41]  Unknown   • Hemiplegia and hemiparesis following nontraumatic subarachnoid hemorrhage affecting right dominant side (CMS/HCC) [I69.051]  Not Applicable   • Essential hypertension [I10]  Unknown      Resolved Hospital Problems   No resolved problems to display.     Sepsis  She is responsive to pain only.  WBC 9.82.  Pro-Lopez normal.  Lactate 4.3, 3.3. Temp 101.2 max. Tachycardic in 130s. Urine negative for WBC and nitrites. Trace bacteria and large amount yeast noted. BCs drawn and pending.  Received Zosyn and Vanco in the ED.  CXR negative acute. EKG sinus. Resp viral panel negative.   No obvious source infection. Lactate could just be elevated secondary to uncontrolled hyperglycemia. Will culture gtube site.  ZAINAB with dehydration  Hypernatremia  Received 3000 bolus NS in ED. rehydrate  Creat 0.99, BUN 54, creat/bun ratio 54.5.   NS maintenance.  Will recheck BMP stat and correct as appropriate for results.   Diabetes mellitus type 2 with hyperglycemia  , 469, 440.  Start Accu-Cheks every 6 hours and cover with NovoLog moderate dose SSI.  Resume long acting home dose insulin.   Hypermagnesemia  2.7. recheck.  Tortous aorta  Incidental finding.   Hypertension  Stable, resume home meds G-tube.    Keep NPO while unresponsive. She normally eats a modified diet of purée texture with thin regular liquids and has Isosource 1.5 tube feeding at 85 mL an hour for a total of 2000 mL daily, starting at 8 PM.  She also gets a bolus of Isosource 1.5 250 mL daily at 4 PM.  Water intake ordered is 300 mL at 8 AM, 4 PM, 8 PM.  Will consult dietitian to see tomorrow to address tube feeding recommendations at that time based on her BMP results.    I have attempted to call the  4 times to give an update and inquire about CODE STATUS.  There is nothing about CODE STATUS in the nursing home records.   Messages left for her .    · I discussed the patient's findings and my recommendations with patient and Dr. Manley.    VTE Prophylaxis - SCDs.  Code Status - Full code.       KIERAN Domínguez  [facility]  03/14/21  13:58 EDT

## 2021-03-14 NOTE — ED PROVIDER NOTES
EMERGENCY DEPARTMENT ENCOUNTER    Room Number:  N536/1  Date seen:  3/14/2021  Time seen: 07:46 EDT  PCP: Germán Freire MD  Historian: [facility] Documents and EMS repot    HPI:  Chief complaint: Hyperglycemia  A complete HPI/ROS/PMH/PSH/SH/FH are unobtainable due to: Nonverbal  Context:Charu Schroeder is a 55 y.o. female, hx of traumatic subarachnoid hemorrhage, who presents to the ED by [facility] for hyperglycemia and fever.  The staff noticed patient's tachypnea, hyperglycemia, and fever last night and sent patient to the ER this morning due to persistent abnormal vital signs.  The blood sugar prior to arrival was in the 400s.  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  Patient does have a history of diabetes.    Per Judi (RN at [facility]), the RN last night noticed patient was sweating and had a heart rate in the 150s and respiratory rate in the 30s, she had a normal temperature at that time with oxygen level of 91% on room air.  The RN this morning noticed that she has had persistent tachycardia and tachypnea and was now hypoxic in the 80s.  Decided to go to the ER for further evaluation.  Denies any recent Covid exposure in their facility.    Patient was placed in face mask in first look. Patient was wearing facemask when I entered the room and throughout our encounter. I wore full protective equipment throughout this patient encounter including a face mask, goggles, and gloves. Hand hygiene was performed before donning protective equipment and after removal when leaving the room.    MEDICAL RECORD REVIEW  Patient was seen here December 2020 for hyperglycemia and altered mental status.    ALLERGIES  Patient has no known allergies.    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 No pertinent family history.    SOCIAL HISTORY  Social History     Socioeconomic History   • Marital status:      Spouse name: Not on file   • Number of children: Not on file   • Years of education: Not on file   • Highest education level: Not on file   Tobacco Use   • Smoking status: Unknown If Ever Smoked   Substance and Sexual Activity   • Alcohol use: Defer   • Drug use: Defer   • Sexual activity: Defer       REVIEW OF SYSTEMS  Review of Systems   Unable to perform ROS: Patient nonverbal (History of traumatic brain injury)       All systems reviewed and negative except for those discussed in HPI.     PHYSICAL EXAM    ED Triage Vitals [03/14/21 0744]   Temp Heart Rate Resp BP SpO2   (!) 101.2 °F (38.4 °C) (!) 135 (!) 32 113/66 92 %      Temp src Heart Rate Source Patient Position BP Location FiO2 (%)   Tympanic Monitor Lying -- --     Physical Exam    I have reviewed the triage vital signs and nursing notes.      GENERAL: not distressed; chronically ill-appearing and appears older than stated age, alert, arouses to sternal rub (baseline for patient)  HENT: nares patent; dry mucous membranes  EYES: no scleral icterus  NECK: no ROM  limitations  CV: regular rhythm, tachycardic  RESPIRATORY: Tachypneic, rales to bilateral lower lobes  ABDOMEN: soft, nontender; G-tube is in place  : deferred  MUSCULOSKELETAL: no deformity  NEURO: alert, moves all extremities, follows commands  SKIN: warm, dry    LAB RESULTS  Recent Results (from the past 24 hour(s))   Comprehensive Metabolic Panel    Collection Time: 03/14/21  8:09 AM    Specimen: Blood   Result Value Ref Range    Glucose 469 (C) 65 - 99 mg/dL    BUN 54 (H) 6 - 20 mg/dL    Creatinine 0.99 0.57 - 1.00 mg/dL    Sodium 162 (C) 136 - 145 mmol/L    Potassium 4.1 3.5 - 5.2 mmol/L    Chloride 120 (H) 98 - 107 mmol/L    CO2 25.1 22.0 - 29.0 mmol/L    Calcium 8.7 8.6 - 10.5 mg/dL    Total Protein 7.1 6.0 - 8.5 g/dL    Albumin 3.50 3.50 - 5.20 g/dL    ALT (SGPT) 15 1 - 33 U/L    AST (SGOT) 15 1 - 32 U/L    Alkaline Phosphatase 105 39 - 117 U/L    Total Bilirubin 0.2 0.0 - 1.2 mg/dL    eGFR Non African Amer 58 (L) >60 mL/min/1.73    Globulin 3.6 gm/dL    A/G Ratio 1.0 g/dL    BUN/Creatinine Ratio 54.5 (H) 7.0 - 25.0    Anion Gap 16.9 (H) 5.0 - 15.0 mmol/L   Lactic Acid, Plasma    Collection Time: 03/14/21  8:09 AM    Specimen: Blood   Result Value Ref Range    Lactate 4.3 (C) 0.5 - 2.0 mmol/L   Procalcitonin    Collection Time: 03/14/21  8:09 AM    Specimen: Blood   Result Value Ref Range    Procalcitonin 0.13 0.00 - 0.25 ng/mL   Magnesium    Collection Time: 03/14/21  8:09 AM    Specimen: Blood   Result Value Ref Range    Magnesium 2.7 (H) 1.6 - 2.6 mg/dL   CBC Auto Differential    Collection Time: 03/14/21  8:09 AM    Specimen: Blood   Result Value Ref Range    WBC 9.82 3.40 - 10.80 10*3/mm3    RBC 5.63 (H) 3.77 - 5.28 10*6/mm3    Hemoglobin 15.7 12.0 - 15.9 g/dL    Hematocrit 51.5 (H) 34.0 - 46.6 %    MCV 91.5 79.0 - 97.0 fL    MCH 27.9 26.6 - 33.0 pg    MCHC 30.5 (L) 31.5 - 35.7 g/dL    RDW 17.4 (H) 12.3 - 15.4 %    RDW-SD 56.3 (H) 37.0 - 54.0 fl    MPV 13.3 (H) 6.0 - 12.0 fL    Platelets 247 140 -  450 10*3/mm3    Neutrophil % 67.3 42.7 - 76.0 %    Lymphocyte % 26.4 19.6 - 45.3 %    Monocyte % 5.4 5.0 - 12.0 %    Eosinophil % 0.0 (L) 0.3 - 6.2 %    Basophil % 0.5 0.0 - 1.5 %    Neutrophils, Absolute 6.61 1.70 - 7.00 10*3/mm3    Lymphocytes, Absolute 2.59 0.70 - 3.10 10*3/mm3    Monocytes, Absolute 0.53 0.10 - 0.90 10*3/mm3    Eosinophils, Absolute 0.00 0.00 - 0.40 10*3/mm3    Basophils, Absolute 0.05 0.00 - 0.20 10*3/mm3   Beta Hydroxybutyrate Quantitative    Collection Time: 03/14/21  8:09 AM    Specimen: Blood   Result Value Ref Range    Beta-Hydroxybutyrate Quant 0.550 (H) 0.020 - 0.270 mmol/L   Troponin    Collection Time: 03/14/21  8:09 AM    Specimen: Blood   Result Value Ref Range    Troponin T 0.032 (C) 0.000 - 0.030 ng/mL   Urinalysis With Microscopic If Indicated (No Culture) - Urine, Catheter    Collection Time: 03/14/21  8:15 AM    Specimen: Urine, Catheter   Result Value Ref Range    Color, UA Dark Yellow (A) Yellow, Straw    Appearance, UA Cloudy (A) Clear    pH, UA <=5.0 5.0 - 8.0    Specific Gravity, UA >=1.030 1.005 - 1.030    Glucose,  mg/dL (2+) (A) Negative    Ketones, UA 15 mg/dL (1+) (A) Negative    Bilirubin, UA Negative Negative    Blood, UA Negative Negative    Protein,  mg/dL (2+) (A) Negative    Leuk Esterase, UA Negative Negative    Nitrite, UA Negative Negative    Urobilinogen, UA 1.0 E.U./dL 0.2 - 1.0 E.U./dL   Urinalysis, Microscopic Only - Urine, Catheter    Collection Time: 03/14/21  8:15 AM    Specimen: Urine, Catheter   Result Value Ref Range    RBC, UA None Seen None Seen, 0-2 /HPF    WBC, UA None Seen None Seen, 0-2 /HPF    Bacteria, UA Trace (A) None Seen /HPF    Squamous Epithelial Cells, UA 3-6 (A) None Seen, 0-2 /HPF    Yeast, UA Large/3+ Budding Yeast None Seen /HPF    Hyaline Casts, UA None Seen None Seen /LPF    Methodology Manual Light Microscopy    ECG 12 Lead    Collection Time: 03/14/21  8:24 AM   Result Value Ref Range    QT Interval 288 ms    Respiratory Panel PCR w/COVID-19(SARS-CoV-2) KIRSTIE/PATSY/TRIXIE/PAD/COR/MAD/RODRIGO In-House, NP Swab in UTM/VTM, 3-4 HR TAT - Swab, Nasopharynx    Collection Time: 03/14/21  9:06 AM    Specimen: Nasopharynx; Swab   Result Value Ref Range    ADENOVIRUS, PCR Not Detected Not Detected    Coronavirus 229E Not Detected Not Detected    Coronavirus HKU1 Not Detected Not Detected    Coronavirus NL63 Not Detected Not Detected    Coronavirus OC43 Not Detected Not Detected    COVID19 Not Detected Not Detected - Ref. Range    Human Metapneumovirus Not Detected Not Detected    Human Rhinovirus/Enterovirus Not Detected Not Detected    Influenza A PCR Not Detected Not Detected    Influenza B PCR Not Detected Not Detected    Parainfluenza Virus 1 Not Detected Not Detected    Parainfluenza Virus 2 Not Detected Not Detected    Parainfluenza Virus 3 Not Detected Not Detected    Parainfluenza Virus 4 Not Detected Not Detected    RSV, PCR Not Detected Not Detected    Bordetella pertussis pcr Not Detected Not Detected    Bordetella parapertussis PCR Not Detected Not Detected    Chlamydophila pneumoniae PCR Not Detected Not Detected    Mycoplasma pneumo by PCR Not Detected Not Detected   POC Glucose Once    Collection Time: 03/14/21 10:07 AM    Specimen: Blood   Result Value Ref Range    Glucose 440 (C) 70 - 130 mg/dL   Blood Gas, Venous -    Collection Time: 03/14/21 10:10 AM    Specimen: Venous Blood   Result Value Ref Range    Site N/A     pH, Venous 7.381 7.310 - 7.410 pH Units    pCO2, Venous 44.1 41.0 - 51.0 mm Hg    pO2, Venous 55.9 (H) 35.0 - 45.0 mm Hg    HCO3, Venous 26.2 22.0 - 28.0 mmol/L    Base Excess, Venous 0.7 -2.0 - 2.0 mmol/L    O2 Saturation Calculated 87.9 (L) 92.0 - 99.0 %    Barometric Pressure for Blood Gas 759.1 mmHg    Modality Cannula     Flow Rate 3 lpm    Rate 18 Breaths/minute   Timed Lactic Acid, Reflex    Collection Time: 03/14/21 11:14 AM    Specimen: Blood   Result Value Ref Range    Lactate 3.3 (C) 0.5 - 2.0  mmol/L   Basic Metabolic Panel    Collection Time: 03/14/21  2:50 PM    Specimen: Blood   Result Value Ref Range    Glucose 363 (H) 65 - 99 mg/dL    BUN 46 (H) 6 - 20 mg/dL    Creatinine 0.79 0.57 - 1.00 mg/dL    Sodium 164 (C) 136 - 145 mmol/L    Potassium 3.5 3.5 - 5.2 mmol/L    Chloride 126 (H) 98 - 107 mmol/L    CO2 26.5 22.0 - 29.0 mmol/L    Calcium 8.0 (L) 8.6 - 10.5 mg/dL    eGFR Non African Amer 76 >60 mL/min/1.73    BUN/Creatinine Ratio 58.2 (H) 7.0 - 25.0    Anion Gap 11.5 5.0 - 15.0 mmol/L   Magnesium    Collection Time: 03/14/21  2:50 PM    Specimen: Blood   Result Value Ref Range    Magnesium 2.6 1.6 - 2.6 mg/dL   POC Glucose Once    Collection Time: 03/14/21  2:58 PM    Specimen: Blood   Result Value Ref Range    Glucose 312 (H) 70 - 130 mg/dL         RADIOLOGY RESULTS  CT Head Without Contrast   Final Result   Evidence of small vessel chronic ischemic change and central   atrophy as noted. No acute intracranial abnormalities identified.       The patient's nurse was informed that a completed corrected report was   available for review on the electronic medical record system on   03/14/2021 at 3:52 PM       This report was finalized on 3/14/2021 3:53 PM by Dr. Dallas Rousseau M.D.          XR Chest 1 View   Final Result   No focal pulmonary consolidation. Borderline heart size.   Tortuous aorta. Follow-up as clinically indicated.       This report was finalized on 3/14/2021 8:57 AM by Dr. Dylan Pena M.D.                PROGRESS, DATA ANALYSIS, CONSULTS AND MEDICAL DECISION MAKING  All labs have been independently reviewed by me.  All radiology studies have been reviewed by me and discussed with radiologist dictating the report. Discussion below represents my analysis of pertinent findings related to patient's condition, differential diagnosis, treatment plan and final disposition.     ED Course as of Mar 14 1559   Sun Mar 14, 2021   0836 I wore full protective equipment throughout this  patient encounter including a CAPPR, gown and gloves. Hand hygiene was performed before donning protective equipment and after removal when leaving the room.        [JG]   0841 EKG independently viewed and contemporaneously interpreted by ED physician. Time: 8:24 AM.  Rate 136.  Interpretation: Sinus tachycardia, normal axis, normal QRS, no acute ST changes.    [JG]   0857 Corrected Sodium is 171.    [SS]   0907 I attempted to call [facility] 3x regarding the patient's CODE STATUS.  No answer.  We will try again later.    [SS]   0931 Febrile, lactic acid 4.3.  Initiating 30 cc/kg fluid bolus, will obtain repeat lactic acid.  Blood cultures obtained.  Chest x-ray unremarkable, urinalysis not consistent with infection.  No clear identified source.  Respiratory viral panel is currently pending.  Initiating broad-spectrum antibiotics for sepsis.    [JG]   1003 I had a lengthy discussion with Dr. Wilkinson regarding patient.  He reports that if  blood pressure is normal, I notified him of the hypernatremia, he advised to call medicine and will consult.    [SS]   1057 I discussed with Dr. Manley [facility] regarding patient.  He agrees to admit patient to telemetry and requests not to give any further antibiotics. RN had just started the Vancomycin but was discontinued.     [SS]   1103 I rechecked patient.  Blood pressure has remained stable and is 112/83 the patient is still tachycardic in the 120s.  I have admitted to a telemetry floor for further monitoring.    [SS]      ED Course User Index  [JG] Sim Hanson MD  [SS] Mary Lyon PA-C       The differential diagnosis include but are not limited to sepsis, COVID-19, pyelonephritis.         Reviewed pt's history and workup with Dr. Hanson.  After a bedside evaluation, Dr. Hanson agrees with the plan of care.        (FOR ADMIT) Based on the patient's lab findings and presenting symptoms, the doctor and I feel it is appropriate to admit the  "patient for further management, evaluation, and treatment.  I have discussed this with the admitting team.  I have also discussed this with the patient/family.  They are in agreement with admission.          Disposition vitals:  /73 (BP Location: Left arm, Patient Position: Lying)   Pulse (!) 125   Temp (!) 102.3 °F (39.1 °C) (Tympanic)   Resp 25   Ht 160 cm (63\")   Wt 68 kg (150 lb)   SpO2 94%   BMI 26.57 kg/m²       DIAGNOSIS  Final diagnoses:   Sepsis, due to unspecified organism, unspecified whether acute organ dysfunction present (CMS/HCC)   Dehydration   Hyperglycemia   Hx of traumatic brain injury   Hypoxic       FOLLOW UP   No follow-up provider specified.       Mary Lyon PA-C  03/14/21 1559       Mary Lyon PA-C  03/14/21 1559    "

## 2021-03-14 NOTE — ED NOTES
Pt arrives via EMS from [facility]. Staff called for increased work of breathing and high blood sugar. States that pt's vitals were elevated last night but they gave the pt her medications and all her vitals returned to normal but her RR. Reports this morning that pt still had increase work of breathing and that her RR was in the 30's. EMS finds the pt to be tachycardic and febrile at 101. Pt has a hx of a subarachnoid hemorrhage and is non-verbal at baseline. Pt has G-tube in place. Pt masked upon arrival and wore a mask the entire encounter. All appropriate PPE worn during entire encounter with patient.        Aicha Mcgill, RN  03/14/21 5890

## 2021-03-14 NOTE — ED NOTES
Nursing report ED to floor  Charu Schroeder  55 y.o.  female    HPI (triage note):   Chief Complaint   Patient presents with   • Hyperglycemia       Admitting doctor:   Sharif Manley MD    Admitting diagnosis:   The primary encounter diagnosis was Dehydration. Diagnoses of Sepsis, due to unspecified organism, unspecified whether acute organ dysfunction present (CMS/HCC), Hyperglycemia, Hx of traumatic brain injury, and Hypoxic were also pertinent to this visit.    Code status:   Current Code Status     Date Active Code Status Order ID Comments User Context       3/14/2021 1143 CPR [id number]  Alta Matthews APRN ED     Advance Care Planning Activity      Questions for Current Code Status     Question Answer Comment    Code Status CPR     Medical Interventions (Level of Support Prior to Arrest) Full           Allergies:   Patient has no known allergies.    Weight:       03/14/21  0752   Weight: 68 kg (150 lb)       Most recent vitals:   Vitals:    03/14/21 1002 03/14/21 1114 03/14/21 1200 03/14/21 1252   BP: 117/69 112/83 115/80 124/78   BP Location:  Left arm  Left arm   Patient Position:  Lying  Lying   Pulse: (!) 134 (!) 130 (!) 133 (!) 130   Resp:  26  25   Temp:    100 °F (37.8 °C)   TempSrc:    Tympanic   SpO2: 92% 94% 94% 94%   Weight:       Height:           Active LDAs/IV Access:   Lines, Drains & Airways    Active LDAs     Name:   Placement date:   Placement time:   Site:   Days:    Peripheral IV 03/14/21 0809 Right Forearm   03/14/21    0809    Forearm   less than 1    Peripheral IV 03/14/21 0905 Left Forearm   03/14/21    0905    Forearm   less than 1                Labs (abnormal labs have a star):   Labs Reviewed   COMPREHENSIVE METABOLIC PANEL - Abnormal; Notable for the following components:       Result Value    Glucose 469 (*)     BUN 54 (*)     Sodium 162 (*)     Chloride 120 (*)     eGFR Non African Amer 58 (*)     BUN/Creatinine Ratio 54.5 (*)     Anion Gap 16.9 (*)     All other  components within normal limits    Narrative:     GFR Normal >60  Chronic Kidney Disease <60  Kidney Failure <15     LACTIC ACID, PLASMA - Abnormal; Notable for the following components:    Lactate 4.3 (*)     All other components within normal limits   MAGNESIUM - Abnormal; Notable for the following components:    Magnesium 2.7 (*)     All other components within normal limits   URINALYSIS W/ MICROSCOPIC IF INDICATED (NO CULTURE) - Abnormal; Notable for the following components:    Color, UA Dark Yellow (*)     Appearance, UA Cloudy (*)     Glucose,  mg/dL (2+) (*)     Ketones, UA 15 mg/dL (1+) (*)     Protein,  mg/dL (2+) (*)     All other components within normal limits   CBC WITH AUTO DIFFERENTIAL - Abnormal; Notable for the following components:    RBC 5.63 (*)     Hematocrit 51.5 (*)     MCHC 30.5 (*)     RDW 17.4 (*)     RDW-SD 56.3 (*)     MPV 13.3 (*)     Eosinophil % 0.0 (*)     All other components within normal limits   BETA HYDROXYBUTYRATE QUANTITATIVE - Abnormal; Notable for the following components:    Beta-Hydroxybutyrate Quant 0.550 (*)     All other components within normal limits    Narrative:     In the assessment of possible diabetic ketoacidosis, the test should be interpreted along with other clinical and laboratory findings.  A level greater than 1 mmol/L should require further evaluation and levels of more than 3 mmol/L require immediate medical review.   TROPONIN (IN-HOUSE) - Abnormal; Notable for the following components:    Troponin T 0.032 (*)     All other components within normal limits    Narrative:     Troponin T Reference Range:  <= 0.03 ng/mL-   Negative for AMI  >0.03 ng/mL-     Abnormal for myocardial necrosis.  Clinicians would have to utilize clinical acumen, EKG, Troponin and serial changes to determine if it is an Acute Myocardial Infarction or myocardial injury due to an underlying chronic condition.       Results may be falsely decreased if patient taking  "Biotin.     URINALYSIS, MICROSCOPIC ONLY - Abnormal; Notable for the following components:    Bacteria, UA Trace (*)     Squamous Epithelial Cells, UA 3-6 (*)     All other components within normal limits   LACTIC ACID, REFLEX - Abnormal; Notable for the following components:    Lactate 3.3 (*)     All other components within normal limits   BLOOD GAS, VENOUS - Abnormal; Notable for the following components:    pO2, Venous 55.9 (*)     O2 Saturation Calculated 87.9 (*)     All other components within normal limits   POCT GLUCOSE FINGERSTICK - Abnormal; Notable for the following components:    Glucose 440 (*)     All other components within normal limits   RESPIRATORY PANEL PCR W/ COVID-19 (SARS-COV-2) KIRSTIE/PATSY/TRIXIE/PAD/COR/MAD/RODRIGO IN-HOUSE, NP SWAB IN UT/VTP, 3-4 HR TAT - Normal    Narrative:     Fact sheet for providers: https://docs.[employer]/wp-content/uploads/TXQ1248-0859-PQ0.1-EUA-Provider-Fact-Sheet-3.pdf    Fact sheet for patients: https://docs.[employer]/wp-content/uploads/DFP5237-4023-HO6.1-EUA-Patient-Fact-Sheet-1.pdf    Test performed by PCR.   PROCALCITONIN - Normal    Narrative:     As a Marker for Sepsis (Non-Neonates):   1. <0.5 ng/mL represents a low risk of severe sepsis and/or septic shock.  1. >2 ng/mL represents a high risk of severe sepsis and/or septic shock.    As a Marker for Lower Respiratory Tract Infections that require antibiotic therapy:  PCT on Admission     Antibiotic Therapy             6-12 Hrs later  > 0.5                Strongly Recommended            >0.25 - <0.5         Recommended  0.1 - 0.25           Discouraged                   Remeasure/reassess PCT  <0.1                 Strongly Discouraged          Remeasure/reassess PCT      As 28 day mortality risk marker: \"Change in Procalcitonin Result\" (> 80 % or <=80 %) if Day 0 (or Day 1) and Day 4 values are available. Refer to http://www.[employer]-pct-calculator.com/   Change in PCT <=80 %   A decrease of PCT levels below or " equal to 80 % defines a positive change in PCT test result representing a higher risk for 28-day all-cause mortality of patients diagnosed with severe sepsis or septic shock.  Change in PCT > 80 %   A decrease of PCT levels of more than 80 % defines a negative change in PCT result representing a lower risk for 28-day all-cause mortality of patients diagnosed with severe sepsis or septic shock.                Results may be falsely decreased if patient taking Biotin.    BLOOD CULTURE   BLOOD CULTURE   BLOOD GAS, VENOUS   POCT GLUCOSE FINGERSTICK   CBC AND DIFFERENTIAL    Narrative:     The following orders were created for panel order CBC & Differential.  Procedure                               Abnormality         Status                     ---------                               -----------         ------                     CBC Auto Differential[[id number]]        Abnormal            Final result                 Please view results for these tests on the individual orders.   KETONE BODIES SERUM    Narrative:     The following orders were created for panel order Ketone Bodies, Serum (Not performed at [facility]).  Procedure                               Abnormality         Status                     ---------                               -----------         ------                     Beta Hydroxybutyrate Arnulfo...[[id number]]  Abnormal            Final result                 Please view results for these tests on the individual orders.       EKG:   ECG 12 Lead   Final Result   HEART RATE= 136  bpm   RR Interval= 440  ms   RI Interval= 144  ms   P Horizontal Axis= -2  deg   P Front Axis= 49  deg   QRSD Interval= 69  ms   QT Interval= 288  ms   QRS Axis= 0  deg   T Wave Axis=   deg   - OTHERWISE NORMAL ECG -   Sinus tachycardia   No change from prior tracing   Electronically Signed By: Coco [facility]) 14-Mar-2021 10:03:44   Date and Time of Study: 2021-03-14 08:24:55          Meds given in ED:   Medications   sodium  chloride 0.9 % flush 10 mL (has no administration in time range)   sodium chloride 0.9 % flush 10 mL (has no administration in time range)   nitroglycerin (NITROSTAT) SL tablet 0.4 mg (has no administration in time range)   sodium chloride 0.9 % infusion (has no administration in time range)   acetaminophen (TYLENOL) tablet 650 mg (has no administration in time range)     Or   acetaminophen (TYLENOL) 160 MG/5ML solution 650 mg (has no administration in time range)     Or   acetaminophen (TYLENOL) suppository 650 mg (has no administration in time range)   melatonin tablet 5 mg (has no administration in time range)   docusate sodium (COLACE) capsule 100 mg (has no administration in time range)   ondansetron (ZOFRAN) injection 4 mg (has no administration in time range)   calcium carbonate (TUMS) chewable tablet 500 mg (200 mg elemental) (has no administration in time range)   acetaminophen (TYLENOL) suppository 650 mg (650 mg Rectal Given 3/14/21 0810)   sodium chloride 0.9 % bolus 1,000 mL (0 mL Intravenous Stopped 3/14/21 1008)   insulin regular (humuLIN R,novoLIN R) injection 6 Units (6 Units Subcutaneous Given 3/14/21 0911)   sodium chloride 0.9 % bolus 2,040 mL (0 mL Intravenous Stopped 3/14/21 1251)   vancomycin 1250 mg/250 mL 0.9% NS IVPB ([facility]) (0 mg Intravenous Stopped 3/14/21 1056)   piperacillin-tazobactam (ZOSYN) 3.375 g in iso-osmotic dextrose 50 ml (premix) (0 g Intravenous Stopped 3/14/21 1052)       Imaging results:  XR Chest 1 View    Result Date: 3/14/2021  No focal pulmonary consolidation. Borderline heart size. Tortuous aorta. Follow-up as clinically indicated.  This report was finalized on 3/14/2021 8:57 AM by Dr. Dylan Pena M.D.        Ambulatory status:   - bedrest      Social issues:   Social History     Socioeconomic History   • Marital status:      Spouse name: Not on file   • Number of children: Not on file   • Years of education: Not on file   • Highest education level: Not on  file   Tobacco Use   • Smoking status: Unknown If Ever Smoked   Substance and Sexual Activity   • Alcohol use: Defer   • Drug use: Defer   • Sexual activity: Defer    Nursing report ED to floor       Emily Tirado RN  03/14/21 8426

## 2021-03-15 LAB
ANION GAP SERPL CALCULATED.3IONS-SCNC: 9.1 MMOL/L (ref 5–15)
ANION GAP SERPL CALCULATED.3IONS-SCNC: 9.2 MMOL/L (ref 5–15)
BUN SERPL-MCNC: 21 MG/DL (ref 6–20)
BUN SERPL-MCNC: 26 MG/DL (ref 6–20)
BUN/CREAT SERPL: 42 (ref 7–25)
BUN/CREAT SERPL: 53.1 (ref 7–25)
CALCIUM SPEC-SCNC: 7.9 MG/DL (ref 8.6–10.5)
CALCIUM SPEC-SCNC: 8.2 MG/DL (ref 8.6–10.5)
CHLORIDE SERPL-SCNC: 120 MMOL/L (ref 98–107)
CHLORIDE SERPL-SCNC: 123 MMOL/L (ref 98–107)
CO2 SERPL-SCNC: 25.8 MMOL/L (ref 22–29)
CO2 SERPL-SCNC: 28.9 MMOL/L (ref 22–29)
CREAT SERPL-MCNC: 0.49 MG/DL (ref 0.57–1)
CREAT SERPL-MCNC: 0.5 MG/DL (ref 0.57–1)
CRP SERPL-MCNC: <0.3 MG/DL (ref 0–0.5)
D-LACTATE SERPL-SCNC: 2.2 MMOL/L (ref 0.5–2)
DEPRECATED RDW RBC AUTO: 54.2 FL (ref 37–54)
ERYTHROCYTE [DISTWIDTH] IN BLOOD BY AUTOMATED COUNT: 16.6 % (ref 12.3–15.4)
GFR SERPL CREATININE-BSD FRML MDRD: 129 ML/MIN/1.73
GFR SERPL CREATININE-BSD FRML MDRD: 132 ML/MIN/1.73
GLUCOSE BLDC GLUCOMTR-MCNC: 146 MG/DL (ref 70–130)
GLUCOSE BLDC GLUCOMTR-MCNC: 201 MG/DL (ref 70–130)
GLUCOSE BLDC GLUCOMTR-MCNC: 229 MG/DL (ref 70–130)
GLUCOSE BLDC GLUCOMTR-MCNC: 253 MG/DL (ref 70–130)
GLUCOSE BLDC GLUCOMTR-MCNC: 285 MG/DL (ref 70–130)
GLUCOSE SERPL-MCNC: 157 MG/DL (ref 65–99)
GLUCOSE SERPL-MCNC: 299 MG/DL (ref 65–99)
HBA1C MFR BLD: 8.7 % (ref 4.8–5.6)
HCT VFR BLD AUTO: 38.5 % (ref 34–46.6)
HGB BLD-MCNC: 11.9 G/DL (ref 12–15.9)
MCH RBC QN AUTO: 28 PG (ref 26.6–33)
MCHC RBC AUTO-ENTMCNC: 30.9 G/DL (ref 31.5–35.7)
MCV RBC AUTO: 90.6 FL (ref 79–97)
PLATELET # BLD AUTO: 167 10*3/MM3 (ref 140–450)
PMV BLD AUTO: 12.9 FL (ref 6–12)
POTASSIUM SERPL-SCNC: 3.4 MMOL/L (ref 3.5–5.2)
POTASSIUM SERPL-SCNC: 3.5 MMOL/L (ref 3.5–5.2)
PROCALCITONIN SERPL-MCNC: 0.09 NG/ML (ref 0–0.25)
RBC # BLD AUTO: 4.25 10*6/MM3 (ref 3.77–5.28)
SODIUM SERPL-SCNC: 158 MMOL/L (ref 136–145)
SODIUM SERPL-SCNC: 158 MMOL/L (ref 136–145)
WBC # BLD AUTO: 7.55 10*3/MM3 (ref 3.4–10.8)

## 2021-03-15 PROCEDURE — 82962 GLUCOSE BLOOD TEST: CPT

## 2021-03-15 PROCEDURE — 85027 COMPLETE CBC AUTOMATED: CPT | Performed by: NURSE PRACTITIONER

## 2021-03-15 PROCEDURE — 83036 HEMOGLOBIN GLYCOSYLATED A1C: CPT | Performed by: NURSE PRACTITIONER

## 2021-03-15 PROCEDURE — 86140 C-REACTIVE PROTEIN: CPT | Performed by: INTERNAL MEDICINE

## 2021-03-15 PROCEDURE — 63710000001 INSULIN LISPRO (HUMAN) PER 5 UNITS: Performed by: HOSPITALIST

## 2021-03-15 PROCEDURE — 80048 BASIC METABOLIC PNL TOTAL CA: CPT | Performed by: NURSE PRACTITIONER

## 2021-03-15 PROCEDURE — 99223 1ST HOSP IP/OBS HIGH 75: CPT | Performed by: INTERNAL MEDICINE

## 2021-03-15 PROCEDURE — 83605 ASSAY OF LACTIC ACID: CPT | Performed by: HOSPITALIST

## 2021-03-15 PROCEDURE — 80048 BASIC METABOLIC PNL TOTAL CA: CPT | Performed by: HOSPITALIST

## 2021-03-15 PROCEDURE — 84145 PROCALCITONIN (PCT): CPT | Performed by: HOSPITALIST

## 2021-03-15 PROCEDURE — 63710000001 INSULIN GLARGINE PER 5 UNITS: Performed by: NURSE PRACTITIONER

## 2021-03-15 RX ORDER — METHYLPHENIDATE HYDROCHLORIDE 5 MG/1
10 TABLET ORAL EVERY 12 HOURS
Status: DISCONTINUED | OUTPATIENT
Start: 2021-03-15 | End: 2021-03-17 | Stop reason: HOSPADM

## 2021-03-15 RX ORDER — POTASSIUM CHLORIDE 1.5 G/1.77G
40 POWDER, FOR SOLUTION ORAL ONCE
Status: COMPLETED | OUTPATIENT
Start: 2021-03-16 | End: 2021-03-16

## 2021-03-15 RX ORDER — POTASSIUM CHLORIDE 1.5 G/1.77G
40 POWDER, FOR SOLUTION ORAL ONCE
Status: COMPLETED | OUTPATIENT
Start: 2021-03-15 | End: 2021-03-15

## 2021-03-15 RX ORDER — DEXTROSE, SODIUM CHLORIDE, AND POTASSIUM CHLORIDE 5; .2; .15 G/100ML; G/100ML; G/100ML
125 INJECTION INTRAVENOUS CONTINUOUS
Status: DISCONTINUED | OUTPATIENT
Start: 2021-03-16 | End: 2021-03-16

## 2021-03-15 RX ADMIN — INSULIN GLARGINE 35 UNITS: 100 INJECTION, SOLUTION SUBCUTANEOUS at 09:48

## 2021-03-15 RX ADMIN — SODIUM CHLORIDE, PRESERVATIVE FREE 10 ML: 5 INJECTION INTRAVENOUS at 10:09

## 2021-03-15 RX ADMIN — AMANTADINE HYDROCHLORIDE 100 MG: 50 SOLUTION ORAL at 14:57

## 2021-03-15 RX ADMIN — INSULIN LISPRO 6 UNITS: 100 INJECTION, SOLUTION INTRAVENOUS; SUBCUTANEOUS at 06:10

## 2021-03-15 RX ADMIN — INSULIN LISPRO 4 UNITS: 100 INJECTION, SOLUTION INTRAVENOUS; SUBCUTANEOUS at 22:24

## 2021-03-15 RX ADMIN — SODIUM CHLORIDE, PRESERVATIVE FREE 10 ML: 5 INJECTION INTRAVENOUS at 22:24

## 2021-03-15 RX ADMIN — AMANTADINE HYDROCHLORIDE 100 MG: 50 SOLUTION ORAL at 22:23

## 2021-03-15 RX ADMIN — METOPROLOL TARTRATE 6.25 MG: 25 TABLET, FILM COATED ORAL at 22:23

## 2021-03-15 RX ADMIN — DEXTROSE AND SODIUM CHLORIDE 125 ML/HR: 5; 450 INJECTION, SOLUTION INTRAVENOUS at 14:57

## 2021-03-15 RX ADMIN — ESCITALOPRAM 20 MG: 20 TABLET, FILM COATED ORAL at 09:48

## 2021-03-15 RX ADMIN — INSULIN LISPRO 6 UNITS: 100 INJECTION, SOLUTION INTRAVENOUS; SUBCUTANEOUS at 01:35

## 2021-03-15 RX ADMIN — INSULIN LISPRO 6 UNITS: 100 INJECTION, SOLUTION INTRAVENOUS; SUBCUTANEOUS at 09:48

## 2021-03-15 RX ADMIN — METOPROLOL TARTRATE 6.25 MG: 25 TABLET, FILM COATED ORAL at 09:48

## 2021-03-15 RX ADMIN — DEXTROSE AND SODIUM CHLORIDE 125 ML/HR: 5; 450 INJECTION, SOLUTION INTRAVENOUS at 01:38

## 2021-03-15 RX ADMIN — INSULIN GLARGINE 35 UNITS: 100 INJECTION, SOLUTION SUBCUTANEOUS at 22:26

## 2021-03-15 RX ADMIN — AMANTADINE HYDROCHLORIDE 100 MG: 50 SOLUTION ORAL at 01:35

## 2021-03-15 RX ADMIN — FAMOTIDINE 20 MG: 20 TABLET, FILM COATED ORAL at 06:10

## 2021-03-15 RX ADMIN — INSULIN LISPRO 4 UNITS: 100 INJECTION, SOLUTION INTRAVENOUS; SUBCUTANEOUS at 12:48

## 2021-03-15 RX ADMIN — DEXTROSE AND SODIUM CHLORIDE 125 ML/HR: 5; 450 INJECTION, SOLUTION INTRAVENOUS at 22:32

## 2021-03-15 RX ADMIN — POTASSIUM CHLORIDE 40 MEQ: 1.5 POWDER, FOR SOLUTION ORAL at 17:53

## 2021-03-15 NOTE — CONSULTS
"Adult Nutrition  Assessment/PES    Patient Name:  Charu Schroeder  YOB: 1965  MRN: [id number]  Admit Date:  3/14/2021    Assessment Date:  3/15/2021    Comments:  Nutrition consult for TF assessment.    Spoke with RN at nursing home.  Pt received the following:  Pureed Thin liquid diet (total feed, would eat about 75%)  IsoSource 1.5    85ml/hr starting at 8pm till 1000ml infused  IsoSource 1.5    250ml bolus at 4pm  Water flushes    300ml (8am, 4pm, 8pm)    TF's alone provided pt with 1875 kcals, 85 gm pro and 1850cc fluids plus po intake.  Labs: glu 299, bun 26, cr .49, Na 158    Per facility she use to be on Diabetisource 1.2 nocturnal feeds but was changed due to wt loss. Per RN blood sugars did increase with change of formula.    Recommend:  Diabetisource 1.2 at 85ml/hr starting at 8pm till 1000ml infused plus  Diabetisouce 1.2    250ml bolus at 10am and 4pm,  Water flushes        300ml  (8am,12pm, 4pm, 8pm)    This will provide 1800 kcals 90 gm protein 2205cc fluid plus what she is able to eat. A calorie Count would best determine po intake and TF needs.       Reason for Assessment     Row Name 03/15/21 0810          Reason for Assessment    Reason For Assessment  physician consult;TF/PN     Diagnosis  -- hx of subarachnoid hemorrhage, admitted for AMS, sepsis, hyperglycemia, dehydration, hypernatremia         Nutrition/Diet History     Row Name 03/15/21 0812          Nutrition/Diet History    Typical Food/Fluid Intake  per RN at NH pt getting Pureed thin liquids, IsoSource 1.5 nocturnal at 85ml/hr starting at 8pm (1000ml/day total) and 1 bolus can at 4pm plus water 300ml 8am, 4pm, 8pm         Anthropometrics     Row Name 03/15/21 0816          Anthropometrics    Height  160 cm (62.99\")     Weight  68 kg (150 lb) not weighed by RD        Ideal Body Weight (IBW)    Ideal Body Weight (IBW) (kg)  52.7     % Ideal Body Weight  129.11        Body Mass Index (BMI)    BMI (kg/m2)  26.63     BMI " "Assessment  BMI 25-29.9: overweight         Labs/Tests/Procedures/Meds     Row Name 03/15/21 0817          Labs/Procedures/Meds    Lab Results Reviewed  reviewed     Lab Results Comments  glu, na, BUN A1C        Diagnostic Tests/Procedures    Diagnostic Test/Procedure Reviewed  reviewed     Diagnostic Test/Procedures Comments  CT of head        Medications    Pertinent Medications Reviewed  reviewed     Pertinent Medications Comments  pepcid, insulin, IV at 125         Physical Findings     Row Name 03/15/21 0818          Physical Findings    Gastrointestinal  feeding tube     Tubes  gastrostomy tube     Skin  pressure injury sacral spine         Estimated/Assessed Needs     Row Name 03/15/21 0820 03/15/21 0816       Calculation Measurements    Weight Used For Calculations  68 kg (149 lb 14.6 oz)  --    Height  --  160 cm (62.99\")       Estimated/Assessed Needs    Additional Documentation  KCAL/KG (Group);Protein Requirements (Group);Fluid Requirements (Group)  --       KCAL/KG    KCAL/KG  25 Kcal/Kg (kcal);30 Kcal/Kg (kcal)  --    25 Kcal/Kg (kcal)  1700  --    30 Kcal/Kg (kcal)  2040  --       Protein Requirements    Weight Used For Protein Calculations  68 kg (149 lb 14.6 oz)  --    Est Protein Requirement Amount (gms/kg)  1.2 gm protein  --    Estimated Protein Requirements (gms/day)  81.6  --       Fluid Requirements    Fluid Requirements (mL/day)  2040  --    RDA Method (mL)  2040  --        Nutrition Prescription Ordered     Row Name 03/15/21 0820          Nutrition Prescription PO    Current PO Diet  NPO                 Problem/Interventions:  Problem 1     Row Name 03/15/21 0823          Nutrition Diagnoses Problem 1    Problem 1  Needs Alternate Route     Etiology (related to)  Medical Diagnosis         Problem 2     Row Name 03/15/21 0823          Nutrition Diagnoses Problem 2    Problem 2  Biting/Chewing Difficulty     Etiology (related to)  Medical Diagnosis             Intervention Goal     Row Name " 03/15/21 0823          Intervention Goal    General  Maintain nutrition;Nutrition support treatment;Improved nutrition related lab(s);Reduce/improve symptoms;Meet nutritional needs for age/condition;Disease management/therapy     PO  Initiate feeding     TF/PN  Inititiate TF/PN     Weight  No significant weight loss         Nutrition Intervention     Row Name 03/15/21 0823          Nutrition Intervention    RD/Tech Action  Follow Tx progress;Care plan reviewd         Nutrition Prescription     Row Name 03/15/21 0925          Nutrition Prescription EN    Enteral Prescription  --     Enteral Route  --     Product  --     TF Delivery Method  --     TF Bolus Goal Volume (mL)  --     TF Bolus Frequency  --     Cyclic TF Goal Volume (mL)  --     Cyclic TF Goal Rate (mL/hr)  --     Water flush (mL)   --     Water Flush Frequency  --        EN to Supply    Kcal/Day  --     Protein (gm/day)  --     TF Free H2O (mL)  --     Total Free H2O (mL/day)  --         Education/Evaluation     Row Name 03/15/21 0823          Education    Education  Education not appropriate at this time     Please explain  Patient nonverbal        Monitor/Evaluation    Monitor  Per protocol           Electronically signed by:  Gricelda Ramirez RD  03/15/21 09:44 EDT

## 2021-03-15 NOTE — PLAN OF CARE
Goal Outcome Evaluation:  Plan of Care Reviewed With: patient  Progress: no change  Outcome Summary: Nonverbal, contractures, PEG clampled, purewick, IVF infusing, accu check q4hrs, sodium trending down slowly, turn q2hr, frequent oral care, will continue to monitor.

## 2021-03-15 NOTE — CONSULTS
Referring Provider: Sharif Manley MD      Subjective   History of present illness:    This 55-year-old we are asked for evaluation of opinion regarding sepsis.    She is unable to give me any history due to nonverbal status.  Apparently she has a history of subarachnoid hemorrhage and is aphasic.  On 3/13 she was found to be tachypneic, tachycardic and had normal temperature.  This was acute onset and persisted.  She was found to have elevated blood sugar in the 400s and was brought to [facility].  Here she was febrile and was started on vancomycin and also received a dose of Zosyn.  She was admitted to the floor.  Initiation of antibiotics she has not had additional fevers.    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  Lives in nursing facility here in [place]    Review of Systems  Review of systems could not be obtained due to   patient nonverbal.    Objective     Physical Exam:   Vital Signs   Temp:  [97.7 °F (36.5 °C)-102.3 °F (39.1 °C)] 97.7 °F (36.5 °C)  Heart Rate:  [] 91  Resp:  [18-32] 18  BP: ()/(55-83) 114/68    GENERAL: Awake and alert, in no acute distress.   HEENT: Oropharynx is clear. Hearing is unable to assess  EYES: Limited pupil exam as  patient refuses to open her eyes. No conjunctival injection. No lid lag.   LYMPHATICS: No lymphadenopathy of the neck or inguinal regions.   HEART: Regular rate and regular rhythm. No peripheral edema.   LUNGS: Clear to auscultation anteriorly with normal respiratory effort.   GI: Soft, nontender, nondistended. No appreciable organomegaly.   SKIN: Warm and dry without cutaneous eruptions   PSYCHIATRIC: She withdraws slightly.  Otherwise no purposeful movement.  Unable to assess additionally    Results Review:  Procalcitonin 0.09 down from 0.13  Sodium 158  White count 7.55  Hemoglobin 11.9  Platelets 167   Abdominal wound culture pending  Blood cultures no growth  Respiratory pathogen panel pending  CT head with small chronic vessel ischemic changes  Chest x-ray with no focal consolidation    Assessment/Plan   1.  Fever in adult  2.  Acute kidney injury with dehydration  3.  Hyponatremia and type 2 diabetes mellitus with hyperglycemia  4.  Candiduria on urinalysis    She did not have any pyuria on her urinalysis and had squamous epithelial cells suggesting this may be a contaminated specimen.  I do not think we need to treat the yeast seen on urinalysis.  Right now she is not having any additional fevers.  Her procalcitonin is negative x2  She is severely dehydrated with a sodium of 162 was hyperglycemic.  Not sure if that may have promoted an inflammatory response during her fevers.  I will check a CRP to help see where we are inflammatory wise.  For now, will hold on additional antibiotics unless she has more fevers.    Thank you for this consult.  We will continue to follow along and tailor antibiotics as the patient's clinical course evolves.      Drake Quiñones MD  03/15/21  09:03 EDT

## 2021-03-15 NOTE — PLAN OF CARE
Pt. VS WNL.  Pt. Does not appear to be in pain.  Pt. Is nonverbal.  Pt. Responds to painful stimuli.  Pt. Q2h turns.  Pt. With PU to coccyx, mepilex CDI.  PU to right groin, mepilex CDI.  Pt. Receiving IVFs.  K+ replaced.  Pt. With SCDs.  Pt. Started on tube feeds today.  Pt. PEG tube dressing changed, purulent drainage, cxs taken prior to this shift.  Palliative care consulted.  Pt. Resting comfortably at present, will continue to monitor closely.      Problem: Adult Inpatient Plan of Care  Goal: Plan of Care Review  Outcome: Ongoing, Progressing  Flowsheets (Taken 3/15/2021 1850)  Progress: no change  Plan of Care Reviewed With: patient

## 2021-03-15 NOTE — DISCHARGE PLACEMENT REQUEST
"Atiya Plummer (55 y.o. Female)     Date of Birth Social Security Number Address Home Phone MRN    1965  [id number] [facility] [phone] [id number]    Catholic Marital Status          None        Admission Date Admission Type Admitting Provider Attending Provider Department, Room/Bed    3/14/21 Emergency Sharif Manley MD Broaddus, Emmett J., MD [address], [address]    Discharge Date Discharge Disposition Discharge Destination                       Attending Provider: Sharif Manley MD    Allergies: No Known Allergies    Isolation: None   Infection: None   Code Status: CPR    Ht: 160 cm (62.99\")   Wt: 68 kg (150 lb)    Admission Cmt: None   Principal Problem: Type 2 diabetes mellitus with hyperglycemia (CMS/[facility]) [E11.65]                 Active Insurance as of 3/14/2021     Primary Coverage     Payor Plan Insurance Group Employer/Plan Group    [place]      Payor Plan Address Payor Plan Phone Number Payor Plan Fax Number Effective Dates    [address] [phone]  1/1/2021 - None Entered    [facility] [id number]       Subscriber Name Subscriber Birth Date Member ID       ATIYA PLUMMER 1965 [id number]                 Emergency Contacts      (Rel.) Home Phone Work Phone Mobile Phone    Kris Plummer (Spouse) -- -- [phone]    Laurel Lange (Sister) -- -- [phone]    Yulia Brenner (Other) -- -- [phone]    Leah Stewart (Sister) -- -- [phone]              "

## 2021-03-15 NOTE — PROGRESS NOTES
"DAILY PROGRESS NOTE  [facility]    Patient Identification:  Name: Charu Schroeder  Age: 55 y.o.  Sex: female  :  1965  MRN: [id number]         Primary Care Physician: Germán Freire MD      Subjective  Patient does not communicate.    Objective:  General Appearance:  Comfortable, in no acute distress and not in pain.    Vital signs: (most recent): Blood pressure 107/47, pulse 88, temperature 97.4 °F (36.3 °C), temperature source Oral, resp. rate 8, height 160 cm (62.99\"), weight 68 kg (150 lb), SpO2 98 %.    HEENT: (Mucous membranes dry but improved.  Patient does mouth breathe.)    Lungs:  Normal effort and normal respiratory rate.  Breath sounds clear to auscultation.    Heart: Normal rate.  Regular rhythm.    Abdomen: Abdomen is soft and non-distended.  Bowel sounds are normal.   There is no abdominal tenderness.     Extremities: There is no dependent edema.    Neurological: (Patient now appears to be awake.  Eyes open.  Gazing around the room but mostly to the left.  She does not communicate.  Minimal response to the examiner.).    Skin:  Warm and dry.                Vital signs in last 24 hours:  Temp:  [97.4 °F (36.3 °C)-98.7 °F (37.1 °C)] 97.4 °F (36.3 °C)  Heart Rate:  [87-99] 88  Resp:  [8-18] 8  BP: ()/(47-80) 107/47    Intake/Output:    Intake/Output Summary (Last 24 hours) at 3/15/2021 1524  Last data filed at 3/15/2021 1430  Gross per 24 hour   Intake 367 ml   Output 1100 ml   Net -733 ml         Results from last 7 days   Lab Units 03/15/21  0502 21  0809   WBC 10*3/mm3 7.55 9.82   HEMOGLOBIN g/dL 11.9* 15.7   PLATELETS 10*3/mm3 167 247     Results from last 7 days   Lab Units 03/15/21  0501 21  2114 21  1450 21  0809   SODIUM mmol/L 158* 162* 164* 162*   POTASSIUM mmol/L 3.5 3.7 3.5 4.1   CHLORIDE mmol/L 123* 124* 126* 120*   CO2 mmol/L 25.8 26.8 26.5 25.1   BUN mg/dL 26* 35* 46* 54*   CREATININE mg/dL 0.49* 0.65 0.79 0.99   GLUCOSE mg/dL 299* 368* " 363* 469*   Estimated Creatinine Clearance: 121.4 mL/min (A) (by C-G formula based on SCr of 0.49 mg/dL (L)).  Results from last 7 days   Lab Units 03/15/21  0501 03/14/21  2114 03/14/21  1450 03/14/21  0809   CALCIUM mg/dL 7.9* 8.1* 8.0* 8.7   ALBUMIN g/dL  --   --   --  3.50   MAGNESIUM mg/dL  --   --  2.6 2.7*     Results from last 7 days   Lab Units 03/14/21  0809   ALBUMIN g/dL 3.50   BILIRUBIN mg/dL 0.2   ALK PHOS U/L 105   AST (SGOT) U/L 15   ALT (SGPT) U/L 15       Assessment:  Severe dehydration: Associated with hypernatremia, hemoconcentration, ZAINAB, tachycardia, fever.  Fluid status much improved.  Continue IV fluids  Hyponatremia: Improving at a very reasonable rate.  Continue half-normal saline and follow closely.  Avoid overly rapid correction.  ZAINAB: Resolved  Diabetes type 2: Poor control.  Marked hyperglycemia presentation probably secondary to stress.  Improving.  Glucophage on hold.  Increase Lantus.  Fever: No bacterial infection noted on work-up.  Infectious disease evaluation appreciated.  Afebrile today.  History of subarachnoid hemorrhage: Follow-up CT scan nonacute.  Traumatic brain injury secondary to above: Associated with severe disability including left hemiplegia with immobility, aphasic, dysphasic with PEG tube in place.  Resume tube feedings.  Hypertension:    Plan:  Please see above.  Discussed with RN.    Sharif Manley MD  3/15/2021  15:24 EDT

## 2021-03-16 LAB
ANION GAP SERPL CALCULATED.3IONS-SCNC: 8.2 MMOL/L (ref 5–15)
BASOPHILS # BLD AUTO: 0.02 10*3/MM3 (ref 0–0.2)
BASOPHILS NFR BLD AUTO: 0.3 % (ref 0–1.5)
BUN SERPL-MCNC: 18 MG/DL (ref 6–20)
BUN/CREAT SERPL: 36.7 (ref 7–25)
CALCIUM SPEC-SCNC: 8.1 MG/DL (ref 8.6–10.5)
CHLORIDE SERPL-SCNC: 116 MMOL/L (ref 98–107)
CO2 SERPL-SCNC: 24.8 MMOL/L (ref 22–29)
CREAT SERPL-MCNC: 0.49 MG/DL (ref 0.57–1)
DEPRECATED RDW RBC AUTO: 51.2 FL (ref 37–54)
EOSINOPHIL # BLD AUTO: 0.09 10*3/MM3 (ref 0–0.4)
EOSINOPHIL NFR BLD AUTO: 1.2 % (ref 0.3–6.2)
ERYTHROCYTE [DISTWIDTH] IN BLOOD BY AUTOMATED COUNT: 15.7 % (ref 12.3–15.4)
GFR SERPL CREATININE-BSD FRML MDRD: 132 ML/MIN/1.73
GLUCOSE BLDC GLUCOMTR-MCNC: 224 MG/DL (ref 70–130)
GLUCOSE BLDC GLUCOMTR-MCNC: 227 MG/DL (ref 70–130)
GLUCOSE BLDC GLUCOMTR-MCNC: 236 MG/DL (ref 70–130)
GLUCOSE BLDC GLUCOMTR-MCNC: 249 MG/DL (ref 70–130)
GLUCOSE BLDC GLUCOMTR-MCNC: 261 MG/DL (ref 70–130)
GLUCOSE BLDC GLUCOMTR-MCNC: 266 MG/DL (ref 70–130)
GLUCOSE SERPL-MCNC: 255 MG/DL (ref 65–99)
HCT VFR BLD AUTO: 36.3 % (ref 34–46.6)
HGB BLD-MCNC: 11.2 G/DL (ref 12–15.9)
LYMPHOCYTES # BLD AUTO: 2.77 10*3/MM3 (ref 0.7–3.1)
LYMPHOCYTES NFR BLD AUTO: 37.6 % (ref 19.6–45.3)
MCH RBC QN AUTO: 27.7 PG (ref 26.6–33)
MCHC RBC AUTO-ENTMCNC: 30.9 G/DL (ref 31.5–35.7)
MCV RBC AUTO: 89.9 FL (ref 79–97)
MONOCYTES # BLD AUTO: 0.39 10*3/MM3 (ref 0.1–0.9)
MONOCYTES NFR BLD AUTO: 5.3 % (ref 5–12)
NEUTROPHILS NFR BLD AUTO: 4.07 10*3/MM3 (ref 1.7–7)
NEUTROPHILS NFR BLD AUTO: 55.3 % (ref 42.7–76)
PLATELET # BLD AUTO: 133 10*3/MM3 (ref 140–450)
PMV BLD AUTO: 12.8 FL (ref 6–12)
POTASSIUM SERPL-SCNC: 4.9 MMOL/L (ref 3.5–5.2)
RBC # BLD AUTO: 4.04 10*6/MM3 (ref 3.77–5.28)
SODIUM SERPL-SCNC: 149 MMOL/L (ref 136–145)
WBC # BLD AUTO: 7.36 10*3/MM3 (ref 3.4–10.8)

## 2021-03-16 PROCEDURE — 80048 BASIC METABOLIC PNL TOTAL CA: CPT | Performed by: HOSPITALIST

## 2021-03-16 PROCEDURE — 85025 COMPLETE CBC W/AUTO DIFF WBC: CPT | Performed by: HOSPITALIST

## 2021-03-16 PROCEDURE — 82962 GLUCOSE BLOOD TEST: CPT

## 2021-03-16 PROCEDURE — 63710000001 INSULIN LISPRO (HUMAN) PER 5 UNITS: Performed by: HOSPITALIST

## 2021-03-16 PROCEDURE — 63710000001 INSULIN GLARGINE PER 5 UNITS: Performed by: NURSE PRACTITIONER

## 2021-03-16 PROCEDURE — 99232 SBSQ HOSP IP/OBS MODERATE 35: CPT | Performed by: INTERNAL MEDICINE

## 2021-03-16 RX ORDER — SODIUM CHLORIDE 9 MG/ML
75 INJECTION, SOLUTION INTRAVENOUS CONTINUOUS
Status: DISCONTINUED | OUTPATIENT
Start: 2021-03-16 | End: 2021-03-16

## 2021-03-16 RX ADMIN — METHYLPHENIDATE HYDROCHLORIDE 10 MG: 5 TABLET ORAL at 15:48

## 2021-03-16 RX ADMIN — INSULIN GLARGINE 35 UNITS: 100 INJECTION, SOLUTION SUBCUTANEOUS at 09:30

## 2021-03-16 RX ADMIN — INSULIN LISPRO 4 UNITS: 100 INJECTION, SOLUTION INTRAVENOUS; SUBCUTANEOUS at 16:40

## 2021-03-16 RX ADMIN — METOPROLOL TARTRATE 6.25 MG: 25 TABLET, FILM COATED ORAL at 21:15

## 2021-03-16 RX ADMIN — SODIUM CHLORIDE, PRESERVATIVE FREE 10 ML: 5 INJECTION INTRAVENOUS at 21:11

## 2021-03-16 RX ADMIN — METOPROLOL TARTRATE 6.25 MG: 25 TABLET, FILM COATED ORAL at 09:30

## 2021-03-16 RX ADMIN — MUPIROCIN: 20 OINTMENT TOPICAL at 21:11

## 2021-03-16 RX ADMIN — MUPIROCIN: 20 OINTMENT TOPICAL at 12:31

## 2021-03-16 RX ADMIN — SODIUM CHLORIDE, PRESERVATIVE FREE 10 ML: 5 INJECTION INTRAVENOUS at 09:30

## 2021-03-16 RX ADMIN — POTASSIUM CHLORIDE, DEXTROSE MONOHYDRATE AND SODIUM CHLORIDE 125 ML/HR: 150; 5; 200 INJECTION, SOLUTION INTRAVENOUS at 01:30

## 2021-03-16 RX ADMIN — INSULIN GLARGINE 35 UNITS: 100 INJECTION, SOLUTION SUBCUTANEOUS at 21:14

## 2021-03-16 RX ADMIN — ESCITALOPRAM 20 MG: 20 TABLET, FILM COATED ORAL at 09:30

## 2021-03-16 RX ADMIN — METFORMIN HYDROCHLORIDE 1000 MG: 1000 TABLET ORAL at 12:31

## 2021-03-16 RX ADMIN — INSULIN LISPRO 4 UNITS: 100 INJECTION, SOLUTION INTRAVENOUS; SUBCUTANEOUS at 01:34

## 2021-03-16 RX ADMIN — METFORMIN HYDROCHLORIDE 1000 MG: 1000 TABLET ORAL at 16:41

## 2021-03-16 RX ADMIN — INSULIN LISPRO 6 UNITS: 100 INJECTION, SOLUTION INTRAVENOUS; SUBCUTANEOUS at 12:31

## 2021-03-16 RX ADMIN — AMANTADINE HYDROCHLORIDE 100 MG: 50 SOLUTION ORAL at 15:48

## 2021-03-16 RX ADMIN — INSULIN LISPRO 4 UNITS: 100 INJECTION, SOLUTION INTRAVENOUS; SUBCUTANEOUS at 05:14

## 2021-03-16 RX ADMIN — FAMOTIDINE 20 MG: 20 TABLET, FILM COATED ORAL at 06:10

## 2021-03-16 RX ADMIN — POTASSIUM CHLORIDE 40 MEQ: 1.5 POWDER, FOR SOLUTION ORAL at 01:29

## 2021-03-16 RX ADMIN — INSULIN LISPRO 4 UNITS: 100 INJECTION, SOLUTION INTRAVENOUS; SUBCUTANEOUS at 21:11

## 2021-03-16 RX ADMIN — INSULIN LISPRO 6 UNITS: 100 INJECTION, SOLUTION INTRAVENOUS; SUBCUTANEOUS at 10:17

## 2021-03-16 NOTE — NURSING NOTE
03/16/21 1515   Wound 03/14/21 sacral spine   Placement Date: 03/14/21   Present on Hospital Admission: Yes  Location: sacral spine  Stage, Pressure Injury : Stage 2   Dressing Appearance intact;moist drainage   Base moist;pink;white   Periwound macerated  (thick wound edges)   Wound Length (cm) 2 cm   Wound Width (cm) 1 cm   Wound Depth (cm) 0.3 cm   Drainage Characteristics/Odor serosanguineous   Drainage Amount scant   Care, Wound   (wound assessed)   Dressing Care   (sacral silicone border dressing intact)   Wound Right anterior groin   No Placement Date or Time found.   Present on Hospital Admission: Yes  Side: Right  Orientation: anterior  Location: groin  Stage, Pressure Injury : Stage 2   Base moist;pink   Periwound Skin Turgor soft   Wound Length (cm) 2 cm   Wound Width (cm) 3 cm   Wound Depth (cm) 0 cm   Drainage Characteristics/Odor serous   Drainage Amount scant   Care, Wound cleansed with;sterile normal saline   Dressing Care border dressing;silicone  (applied)     WOCN dept - patient presented to hospital with a wound to her to gluteal cleft/coccyx region, consistent with intertrigo as etiology. Wound is moist and needs sn absorptive dressing along with protection. Recommend to apply hydrofiber to wound bed and cover with silicone border foam dressing and change every other day and as needed. Patient soils often so frequent changing likely necessary.  Patient with a very superficial wound to R groin/thigh under abdominal fold. Area is moist, wound pink, smooth and moist. Needs to be covered and protected from skin on skin friction. Optifoam applied

## 2021-03-16 NOTE — PROGRESS NOTES
"DAILY PROGRESS NOTE  [facility]    Patient Identification:  Name: Charu Schroeder  Age: 55 y.o.  Sex: female  :  1965  MRN: [id number]         Primary Care Physician: Germán Freire MD      Subjective  Patient nonverbal at baseline.    Objective:  General Appearance:  Comfortable, in no acute distress and not in pain.    Vital signs: (most recent): Blood pressure 103/52, pulse 84, temperature 98.7 °F (37.1 °C), temperature source Oral, resp. rate 18, height 160 cm (62.99\"), weight 68 kg (150 lb), SpO2 98 %.    Lungs:  Normal effort and normal respiratory rate.  Breath sounds clear to auscultation.    Heart: Normal rate.  Regular rhythm.    Extremities: There is no dependent edema.    Neurological: (Patient now awake.  Nonverbal which is her baseline.  Minimal eye contact.  Again tends to look off more towards the left.).    Skin:  Warm and dry.  (Very superficial erosion right groin area.  Appears clean.  PEG present in the left upper quadrant.  There is secretions around the PEG site but no erythema, no induration.)              Vital signs in last 24 hours:  Temp:  [96.8 °F (36 °C)-98.7 °F (37.1 °C)] 98.7 °F (37.1 °C)  Heart Rate:  [77-90] 84  Resp:  [8-18] 18  BP: (103-114)/(47-71) 103/52    Intake/Output:    Intake/Output Summary (Last 24 hours) at 3/16/2021 1055  Last data filed at 3/16/2021 0927  Gross per 24 hour   Intake 90 ml   Output 850 ml   Net -760 ml         Results from last 7 days   Lab Units 21  0959 03/15/21  0502 21  0809   WBC 10*3/mm3 7.36 7.55 9.82   HEMOGLOBIN g/dL 11.2* 11.9* 15.7   PLATELETS 10*3/mm3 133* 167 247     Results from last 7 days   Lab Units 21  0358 03/15/21  1537 03/15/21  0501 21  2114 21  1450 21  0809   SODIUM mmol/L 149* 158* 158* 162* 164* 162*   POTASSIUM mmol/L 4.9 3.4* 3.5 3.7 3.5 4.1   CHLORIDE mmol/L 116* 120* 123* 124* 126* 120*   CO2 mmol/L 24.8 28.9 25.8 26.8 26.5 25.1   BUN mg/dL 18 21* 26* 35* 46* 54* "   CREATININE mg/dL 0.49* 0.50* 0.49* 0.65 0.79 0.99   GLUCOSE mg/dL 255* 157* 299* 368* 363* 469*   Estimated Creatinine Clearance: 121.4 mL/min (A) (by C-G formula based on SCr of 0.49 mg/dL (L)).  Results from last 7 days   Lab Units 03/16/21  0358 03/15/21  1537 03/15/21  0501 03/14/21  2114 03/14/21  1450 03/14/21  0809   CALCIUM mg/dL 8.1* 8.2* 7.9* 8.1* 8.0* 8.7   ALBUMIN g/dL  --   --   --   --   --  3.50   MAGNESIUM mg/dL  --   --   --   --  2.6 2.7*     Results from last 7 days   Lab Units 03/14/21  0809   ALBUMIN g/dL 3.50   BILIRUBIN mg/dL 0.2   ALK PHOS U/L 105   AST (SGOT) U/L 15   ALT (SGPT) U/L 15       Assessment:  Severe dehydration: Associated with hypernatremia, hemoconcentration, ZAINAB, tachycardia, fever.    Resolved.  Hypernatremia:  Much improved.  Initially improving at a good slow rate but had a significant drop in the last 12 hours.  Will discontinue IV fluids.  Continue to monitor closely.  ZAINAB: Resolved  Diabetes type 2: Poor control.  Resume Glucophage.    Fever: No bacterial infection noted on work-up.  Infectious disease evaluation appreciated.  Afebrile today.  History of subarachnoid hemorrhage: Follow-up CT scan nonacute.  Traumatic brain injury secondary to above: Associated with severe disability including left hemiplegia with immobility, aphasic, dysphasic with PEG tube in place.    Tube feedings resumed.  Hypertension:  MRSA colonization of PEG site: Continue dressings.  Add Bactroban locally.      Plan:  Please see above.  Discussed with RN.  Discharge planning.    Sharif Manley MD  3/16/2021  10:55 EDT

## 2021-03-16 NOTE — PLAN OF CARE
Problem: Adult Inpatient Plan of Care  Goal: Plan of Care Review  Outcome: Ongoing, Progressing  Flowsheets (Taken 3/16/2021 0550)  Progress: improving  Plan of Care Reviewed With: patient  Outcome Summary: Tolerated tube feeding without distress, no residual noted with increases. Pt is at goal rate of 65 at this time.  Goal: Patient-Specific Goal (Individualized)  Outcome: Ongoing, Progressing  Goal: Absence of Hospital-Acquired Illness or Injury  Outcome: Ongoing, Progressing  Intervention: Identify and Manage Fall Risk  Recent Flowsheet Documentation  Taken 3/16/2021 0207 by Jen Avalos RN  Safety Promotion/Fall Prevention: safety round/check completed  Taken 3/16/2021 0009 by Jen Avalos RN  Safety Promotion/Fall Prevention: safety round/check completed  Taken 3/15/2021 2015 by Jen Avalos RN  Safety Promotion/Fall Prevention: safety round/check completed  Intervention: Prevent Skin Injury  Recent Flowsheet Documentation  Taken 3/16/2021 0207 by Jen Avalos RN  Body Position: tilted, right  Taken 3/16/2021 0009 by Jen Avalos RN  Body Position: supine  Taken 3/15/2021 2015 by Jen Avalos RN  Body Position: side-lying, right  Goal: Optimal Comfort and Wellbeing  Outcome: Ongoing, Progressing  Goal: Readiness for Transition of Care  Outcome: Ongoing, Progressing   Goal Outcome Evaluation:  Plan of Care Reviewed With: patient  Progress: improving  Outcome Summary: Tolerated tube feeding without distress, no residual noted with increases. Pt is at goal rate of 65 at this time.

## 2021-03-16 NOTE — PROGRESS NOTES
ID note for fever  Subjective: Patient is nonverbal but discussed with nursing.  She is not any additional fevers.  She was on 2 L but satting 100% we are able to turn this off and it looks like she maintained her oxygen saturations okay  Objective: She is made a little more interactive and alert today but still nonverbal not following commands for me, pulmonary effort is normal, abdomen is soft and she has some expected exudate from her PEG tube.  There is no erythema around it.      Sodium 149  Creatinine 0.49  Glucose 146 through 255   CRP was undetectable  Procalcitonin 0.09  Wound culture grew MRSA  Blood cultures no growth to date    Assessment and plan  1.  Fever in adult  2.  Acute kidney injury with dehydration  3.  Hypernatremia and type 2 diabetes mellitus with hyperglycemia  4.  Candiduria on urinalysis, no need to treat    She is doing better.  Her sodium is trending down with hydration and her renal function has also settled out at about 0.5.  Her wound culture from her PEG tube grew MRSA but she is not having any fevers and I do not think it looks infected to me at this time.  I would just monitor off antibiotics.  No need to treat candiduria seen on urinalysis.  Nothing more to add from the infectious disease perspective at this time.  We will be happy to reevaluate should she develop recurrent fevers or infectious concerns evolve

## 2021-03-16 NOTE — PROGRESS NOTES
Continued Stay Note  [facility]     Patient Name: Charu Schroeder  MRN: [id number]  Today's Date: 3/16/2021    Admit Date: 3/14/2021    Discharge Plan     Row Name 03/16/21 1735       Plan    Plan  Awaiting call back from Spouse; palliative consulted    Plan Comments  CCP called pts spouse Kris Schroeder [phone] and left  requesting call back. CCP spoke with [facility], pt is from their skilled rehab unit. Pt was able to whisper her needs and tell things like wanted tv on or off, but was limited with her extremities. CCP explained Palliative consulted and she verbalized understanding. She states if pts family does not want to pursue palliative, pt can return back for continued rehab on [facility]. CCP awaiting call back from spouse, palliative consulted. CCP to follow. Rui PETE/CCP        Discharge Codes    No documentation.             Angelia Henson, RN

## 2021-03-16 NOTE — NURSING NOTE
This RN s/w patient's spouse, Kris Schroeder this a.m.  Regarding code status, per MD order.  Kris states for now he would like her to be a full code/CPR.  Kris states he would like patient's sister to be the decision maker.  Kris states there is no living will or POA paperwork.  Kris informed of CCP and Palliative care team needing to speak with him, states he is available after 5pm daily.  This RN informed MD, CCP, and Palliative care nurse of this conversation.

## 2021-03-16 NOTE — PLAN OF CARE
Pt. VS WNL.  Pt. Does not appear to be in pain.  Pt. Is nonverbal.  Pt. Much more alert today.  Pt. Q2h turns.  Pt. With PU to coccyx, mepilex changed per wound care nurse, CDI.  PU to right groin, mepilex changed, Bacitracin applied, CDI.  Pt. IVFs stopped.  Pt. Given complete bed bath.  Pt. With SCDs.  Pt. Started on tube feeds yesterday, now at goal.  Pt. PEG tube dressing changed, purulent drainage, cxs taken prior to this shift.  Palliative care consulted.  Pt. Resting comfortably at present, will continue to monitor closely.      Problem: Adult Inpatient Plan of Care  Goal: Plan of Care Review  Outcome: Ongoing, Progressing  Flowsheets (Taken 3/16/2021 3869)  Progress: improving  Plan of Care Reviewed With: patient

## 2021-03-17 VITALS
HEIGHT: 63 IN | DIASTOLIC BLOOD PRESSURE: 65 MMHG | RESPIRATION RATE: 16 BRPM | BODY MASS INDEX: 26.58 KG/M2 | HEART RATE: 96 BPM | SYSTOLIC BLOOD PRESSURE: 115 MMHG | WEIGHT: 150 LBS | TEMPERATURE: 98.5 F | OXYGEN SATURATION: 94 %

## 2021-03-17 LAB
ANION GAP SERPL CALCULATED.3IONS-SCNC: 12.7 MMOL/L (ref 5–15)
BACTERIA SPEC AEROBE CULT: ABNORMAL
BACTERIA SPEC AEROBE CULT: ABNORMAL
BASOPHILS # BLD AUTO: 0.01 10*3/MM3 (ref 0–0.2)
BASOPHILS NFR BLD AUTO: 0.2 % (ref 0–1.5)
BUN SERPL-MCNC: 16 MG/DL (ref 6–20)
BUN/CREAT SERPL: 36.4 (ref 7–25)
CALCIUM SPEC-SCNC: 8.2 MG/DL (ref 8.6–10.5)
CHLORIDE SERPL-SCNC: 109 MMOL/L (ref 98–107)
CO2 SERPL-SCNC: 25.3 MMOL/L (ref 22–29)
CREAT SERPL-MCNC: 0.44 MG/DL (ref 0.57–1)
DEPRECATED RDW RBC AUTO: 51.8 FL (ref 37–54)
EOSINOPHIL # BLD AUTO: 0.08 10*3/MM3 (ref 0–0.4)
EOSINOPHIL NFR BLD AUTO: 1.2 % (ref 0.3–6.2)
ERYTHROCYTE [DISTWIDTH] IN BLOOD BY AUTOMATED COUNT: 15.9 % (ref 12.3–15.4)
GFR SERPL CREATININE-BSD FRML MDRD: 149 ML/MIN/1.73
GLUCOSE BLDC GLUCOMTR-MCNC: 101 MG/DL (ref 70–130)
GLUCOSE BLDC GLUCOMTR-MCNC: 109 MG/DL (ref 70–130)
GLUCOSE BLDC GLUCOMTR-MCNC: 143 MG/DL (ref 70–130)
GLUCOSE BLDC GLUCOMTR-MCNC: 170 MG/DL (ref 70–130)
GLUCOSE BLDC GLUCOMTR-MCNC: 181 MG/DL (ref 70–130)
GLUCOSE SERPL-MCNC: 187 MG/DL (ref 65–99)
GRAM STN SPEC: ABNORMAL
HCT VFR BLD AUTO: 37.7 % (ref 34–46.6)
HGB BLD-MCNC: 12 G/DL (ref 12–15.9)
IMM GRANULOCYTES # BLD AUTO: 0.03 10*3/MM3 (ref 0–0.05)
IMM GRANULOCYTES NFR BLD AUTO: 0.5 % (ref 0–0.5)
LYMPHOCYTES # BLD AUTO: 2.1 10*3/MM3 (ref 0.7–3.1)
LYMPHOCYTES NFR BLD AUTO: 32.6 % (ref 19.6–45.3)
MCH RBC QN AUTO: 28.5 PG (ref 26.6–33)
MCHC RBC AUTO-ENTMCNC: 31.8 G/DL (ref 31.5–35.7)
MCV RBC AUTO: 89.5 FL (ref 79–97)
MONOCYTES # BLD AUTO: 0.21 10*3/MM3 (ref 0.1–0.9)
MONOCYTES NFR BLD AUTO: 3.3 % (ref 5–12)
NEUTROPHILS NFR BLD AUTO: 4.01 10*3/MM3 (ref 1.7–7)
NEUTROPHILS NFR BLD AUTO: 62.2 % (ref 42.7–76)
NRBC BLD AUTO-RTO: 0.2 /100 WBC (ref 0–0.2)
PLATELET # BLD AUTO: 139 10*3/MM3 (ref 140–450)
PMV BLD AUTO: 13 FL (ref 6–12)
POTASSIUM SERPL-SCNC: 3.9 MMOL/L (ref 3.5–5.2)
RBC # BLD AUTO: 4.21 10*6/MM3 (ref 3.77–5.28)
SODIUM SERPL-SCNC: 147 MMOL/L (ref 136–145)
WBC # BLD AUTO: 6.44 10*3/MM3 (ref 3.4–10.8)

## 2021-03-17 PROCEDURE — 82962 GLUCOSE BLOOD TEST: CPT

## 2021-03-17 PROCEDURE — 63710000001 INSULIN GLARGINE PER 5 UNITS: Performed by: NURSE PRACTITIONER

## 2021-03-17 PROCEDURE — 85025 COMPLETE CBC W/AUTO DIFF WBC: CPT | Performed by: HOSPITALIST

## 2021-03-17 PROCEDURE — 80048 BASIC METABOLIC PNL TOTAL CA: CPT | Performed by: HOSPITALIST

## 2021-03-17 PROCEDURE — 63710000001 INSULIN LISPRO (HUMAN) PER 5 UNITS: Performed by: HOSPITALIST

## 2021-03-17 RX ADMIN — INSULIN LISPRO 2 UNITS: 100 INJECTION, SOLUTION INTRAVENOUS; SUBCUTANEOUS at 01:05

## 2021-03-17 RX ADMIN — METFORMIN HYDROCHLORIDE 1000 MG: 1000 TABLET ORAL at 08:13

## 2021-03-17 RX ADMIN — AMANTADINE HYDROCHLORIDE 100 MG: 50 SOLUTION ORAL at 16:53

## 2021-03-17 RX ADMIN — METHYLPHENIDATE HYDROCHLORIDE 10 MG: 5 TABLET ORAL at 06:01

## 2021-03-17 RX ADMIN — INSULIN GLARGINE 35 UNITS: 100 INJECTION, SOLUTION SUBCUTANEOUS at 08:17

## 2021-03-17 RX ADMIN — FAMOTIDINE 20 MG: 20 TABLET, FILM COATED ORAL at 06:01

## 2021-03-17 RX ADMIN — ESCITALOPRAM 20 MG: 20 TABLET, FILM COATED ORAL at 08:13

## 2021-03-17 RX ADMIN — INSULIN LISPRO 2 UNITS: 100 INJECTION, SOLUTION INTRAVENOUS; SUBCUTANEOUS at 04:44

## 2021-03-17 RX ADMIN — SODIUM CHLORIDE, PRESERVATIVE FREE 10 ML: 5 INJECTION INTRAVENOUS at 08:13

## 2021-03-17 RX ADMIN — METOPROLOL TARTRATE 6.25 MG: 25 TABLET, FILM COATED ORAL at 08:13

## 2021-03-17 RX ADMIN — METHYLPHENIDATE HYDROCHLORIDE 10 MG: 5 TABLET ORAL at 18:43

## 2021-03-17 RX ADMIN — AMANTADINE HYDROCHLORIDE 100 MG: 50 SOLUTION ORAL at 01:05

## 2021-03-17 RX ADMIN — METFORMIN HYDROCHLORIDE 1000 MG: 1000 TABLET ORAL at 18:43

## 2021-03-17 RX ADMIN — MUPIROCIN: 20 OINTMENT TOPICAL at 08:13

## 2021-03-17 NOTE — PROGRESS NOTES
Continued Stay Note  [facility]     Patient Name: Charu Schroeder  MRN: [id number]  Today's Date: 3/17/2021    Admit Date: 3/14/2021    Discharge Plan     Row Name 03/17/21 1340       Plan    Plan  Return back to [facility] via EMS    Patient/Family in Agreement with Plan  other (see comments)    Plan Comments  Reviewed clinicals, pt much improved. DC orders noted. CCP called x3 spouse Kris [phone] and left vm regarding dc back to facility. CCP arranged EMS Transport. Called [facility] and left vm regarding dc back today. Packet given to JUAN R Worley. Left vm for Guerita SHANKS RN/CCP Manager. Rui PETE/CCP        Discharge Codes    No documentation.       Expected Discharge Date and Time     Expected Discharge Date Expected Discharge Time    Mar 17, 2021             Angelia Henson RN

## 2021-03-17 NOTE — PROGRESS NOTES
"DAILY PROGRESS NOTE  [facility]    Patient Identification:  Name: Charu Schroeder  Age: 55 y.o.  Sex: female  :  1965  MRN: [id number]         Primary Care Physician: Germán Freire MD      Subjective  Patient does not communicate verbally.  When asked if she was doing okay she did shake her head yes however.    Objective:  General Appearance:  Comfortable, in no acute distress and not in pain.    Vital signs: (most recent): Blood pressure 110/60, pulse 84, temperature 98.9 °F (37.2 °C), temperature source Oral, resp. rate 17, height 160 cm (62.99\"), weight 68 kg (150 lb), SpO2 94 %.    Lungs:  Normal effort and normal respiratory rate.  Breath sounds clear to auscultation.    Heart: Normal rate.  Regular rhythm.    Abdomen: Abdomen is soft.    Extremities: There is no dependent edema.    Neurological: (Patient is awake.  When asked if she knew where she was she did look around somewhat puzzled.  I informed her she was at [facility].  She did cooperate with exam.  She is able to squeeze my finger with the left hand.  She also followed instructions when asked for deep inspirations when I auscultated her lungs.).    Skin:  Warm and dry.                Vital signs in last 24 hours:  Temp:  [97.5 °F (36.4 °C)-98.9 °F (37.2 °C)] 98.9 °F (37.2 °C)  Heart Rate:  [81-85] 84  Resp:  [14-18] 17  BP: (105-110)/(55-64) 110/60    Intake/Output:    Intake/Output Summary (Last 24 hours) at 3/17/2021 1210  Last data filed at 3/17/2021 0913  Gross per 24 hour   Intake 1170 ml   Output 1150 ml   Net 20 ml         Results from last 7 days   Lab Units 21  0549 21  0959 03/15/21  0502 21  0809   WBC 10*3/mm3 6.44 7.36 7.55 9.82   HEMOGLOBIN g/dL 12.0 11.2* 11.9* 15.7   PLATELETS 10*3/mm3 139* 133* 167 247     Results from last 7 days   Lab Units 21  0549 21  0358 03/15/21  1537 03/15/21  0501 21  2114 21  1450 21  0809   SODIUM mmol/L 147* 149* 158* 158* 162* " 164* 162*   POTASSIUM mmol/L 3.9 4.9 3.4* 3.5 3.7 3.5 4.1   CHLORIDE mmol/L 109* 116* 120* 123* 124* 126* 120*   CO2 mmol/L 25.3 24.8 28.9 25.8 26.8 26.5 25.1   BUN mg/dL 16 18 21* 26* 35* 46* 54*   CREATININE mg/dL 0.44* 0.49* 0.50* 0.49* 0.65 0.79 0.99   GLUCOSE mg/dL 187* 255* 157* 299* 368* 363* 469*   Estimated Creatinine Clearance: 135.2 mL/min (A) (by C-G formula based on SCr of 0.44 mg/dL (L)).  Results from last 7 days   Lab Units 03/17/21  0549 03/16/21  0358 03/15/21  1537 03/15/21  0501 03/14/21  2114 03/14/21  1450 03/14/21  0809   CALCIUM mg/dL 8.2* 8.1* 8.2* 7.9* 8.1* 8.0* 8.7   ALBUMIN g/dL  --   --   --   --   --   --  3.50   MAGNESIUM mg/dL  --   --   --   --   --  2.6 2.7*     Results from last 7 days   Lab Units 03/14/21  0809   ALBUMIN g/dL 3.50   BILIRUBIN mg/dL 0.2   ALK PHOS U/L 105   AST (SGOT) U/L 15   ALT (SGPT) U/L 15       Assessment:  Severe dehydration: Associated with hypernatremia, hemoconcentration, ZAINAB, tachycardia, fever.    Resolved. Hypernatremia:  Much improved.    Nearly normal.  Now improving with the G-tube feedings without IV fluids.  ZAINAB: Resolved  Diabetes type 2: Poor control.  Resume Glucophage.    Fever: No bacterial infection noted on work-up.  Infectious disease evaluation appreciated.  Now afebrile.  History of subarachnoid hemorrhage: Follow-up CT scan nonacute.  Traumatic brain injury secondary to above: Associated with severe disability with immobility, aphasic, dysphasic with PEG tube in place.    Tube feedings resumed.  Hypertension: Controlled  MRSA colonization of PEG site:  No evidence of invasive infection.  Continue dressings.  Add Bactroban locally       Plan:  Please see above.  Discharge planning.  Discussed with RN.  Discussed with CCP.    Sharif Manley MD  3/17/2021  12:10 EDT

## 2021-03-17 NOTE — PLAN OF CARE
Problem: Adult Inpatient Plan of Care  Goal: Absence of Hospital-Acquired Illness or Injury  Intervention: Identify and Manage Fall Risk  Recent Flowsheet Documentation  Taken 3/17/2021 0408 by Jen Avalos RN  Safety Promotion/Fall Prevention: safety round/check completed  Taken 3/17/2021 0204 by Jen Avalos RN  Safety Promotion/Fall Prevention: safety round/check completed  Taken 3/17/2021 0030 by Jen Avalos RN  Safety Promotion/Fall Prevention: safety round/check completed  Taken 3/16/2021 2216 by Jen Avalos RN  Safety Promotion/Fall Prevention: safety round/check completed  Taken 3/16/2021 1938 by Jen Avalos RN  Safety Promotion/Fall Prevention: safety round/check completed   Goal Outcome Evaluation:  Plan of Care Reviewed With: patient  Progress: improving  Outcome Summary: No changes in condition, tolerating tube feedings, awake most of the night. Normal sinus on the monitor.

## 2021-03-17 NOTE — PLAN OF CARE
Problem: Adult Inpatient Plan of Care  Goal: Plan of Care Review  Outcome: Met  Goal: Patient-Specific Goal (Individualized)  Outcome: Met  Goal: Absence of Hospital-Acquired Illness or Injury  Outcome: Met  Intervention: Identify and Manage Fall Risk  Recent Flowsheet Documentation  Taken 3/17/2021 1600 by Meir Harrell RN  Safety Promotion/Fall Prevention:   activity supervised   nonskid shoes/slippers when out of bed   safety round/check completed  Taken 3/17/2021 1400 by Meir Harrell RN  Safety Promotion/Fall Prevention:   activity supervised   nonskid shoes/slippers when out of bed   safety round/check completed  Taken 3/17/2021 1200 by Meir Harrell RN  Safety Promotion/Fall Prevention:   activity supervised   nonskid shoes/slippers when out of bed   safety round/check completed  Taken 3/17/2021 1000 by Meir Harrell RN  Safety Promotion/Fall Prevention:   activity supervised   nonskid shoes/slippers when out of bed   safety round/check completed  Taken 3/17/2021 0800 by Meir Harrell RN  Safety Promotion/Fall Prevention:   activity supervised   nonskid shoes/slippers when out of bed   safety round/check completed  Goal: Optimal Comfort and Wellbeing  Outcome: Met  Goal: Readiness for Transition of Care  Outcome: Met   Goal Outcome Evaluation:

## 2021-03-17 NOTE — PROGRESS NOTES
Continued Stay Note  [facility]     Patient Name: Charu Schroeder  MRN: [id number]  Today's Date: 3/17/2021    Admit Date: 3/14/2021    Discharge Plan     Row Name 03/17/21 1615       Plan    Plan  Return back to [facility] via EMS AT 8PM    Patient/Family in Agreement with Plan  yes    Plan Comments  Recieved vm from spouse Kris returning CCP call to call him back. CCP called pts spouse rKis [phone] and left vm stating dc back to [facility] today at 8pm. RUI PETE/CCP    Row Name 03/17/21 134       Plan    Plan  Return back to [facility] via EMS    Patient/Family in Agreement with Plan  other (see comments)    Plan Comments  Reviewed clinicals, pt much improved. DC orders noted. CCP called x3 spouse Kris [phone] and left vm regarding dc back to facility. CCP arranged EMS Transport. Called [facility] and left vm regarding dc back today. Packet given to JUAN R Worley. Left vm for Guerita SHANKS RN/CCP Manager. Rui PETE/CCP        Discharge Codes    No documentation.       Expected Discharge Date and Time     Expected Discharge Date Expected Discharge Time    Mar 17, 2021             Angelia Henson RN

## 2021-03-17 NOTE — DISCHARGE SUMMARY
PHYSICIAN DISCHARGE SUMMARY                                                                        [facility]    Patient Identification:  Name: Charu Schroeder  Age: 55 y.o.  Sex: female  :  1965  MRN: [id number]  Primary Care Physician: Germán Freire MD    Admit date: 3/14/2021  Discharge date and time: 3/17/2021     Discharged Condition: stable    Discharge Diagnoses:  Severe dehydration: Associated with hypernatremia, hemoconcentration, ZAINAB, tachycardia, fever.    Resolved. Hypernatremia:  Much improved.    Nearly normal.  Now improving with the G-tube feedings without IV fluids.  ZAINAB: Resolved  Diabetes type 2:   Fever: No bacterial infection noted on work-up.  Infectious disease evaluation appreciated.  Now afebrile.  History of subarachnoid hemorrhage: Follow-up CT scan nonacute.  Traumatic brain injury secondary to above: Associated with severe disability with immobility, aphasic, dysphasic with PEG tube in place.    Tube feedings resumed.  Hypertension: Controlled  MRSA colonization of PEG site:   No active infection.  Local treatment.      Hospital Course:  Pleasant 55-year-old female who unfortunately has suffered from a subarachnoid hemorrhage leaving her with very severe neurologic deficits.  She is aphasic and dysphasic and dependent on PEG feedings for nutrition hydration.  She is transferred to this facility reportedly due to fever, hyperglycemia and tachypnea.  On initial work-up she was severely hospital dehydrated.  This associate with a significant hyponatremia, ZAINAB, hemoconcentration.  Work-up of fever did not reveal any evidence of underlying active bacterial infection.  Infectious disease consultation was obtained and they were in agreement.  She was initially treated with aggressive hydration.  Not knowing how long she had been hyponatremic we attempted to bring her sodium down slowly.  She has responded  "nicely.  Her level of interaction activity has improved significantly with hydration.  IV fluids were discontinued yesterday and with reactivation of the tube feedings she is now improving with tube feedings only, without IV fluids.  Blood sugar control is also markedly improved.  At this point then she can be transferred back to the referring facility for minor treatment follow-up.  Very close attention to her fluid status of course is recommended.  Repeat BMP from Monday has been requested.      Consults:     Consults     Date and Time Order Name Status Description    3/14/2021  3:06 PM Inpatient Infectious Diseases Consult Completed     3/14/2021 10:15 AM [facility] (on-call MD unless specified) Details Completed     3/14/2021  9:41 AM Pulmonology (on-call MD unless specified) Completed             Discharge Exam:  General Appearance:  Comfortable, in no acute distress and not in pain.    Vital signs: (most recent): Blood pressure 110/60, pulse 84, temperature 98.9 °F (37.2 °C), temperature source Oral, resp. rate 17, height 160 cm (62.99\"), weight 68 kg (150 lb), SpO2 94 %.    Lungs:  Normal effort and normal respiratory rate.  Breath sounds clear to auscultation.    Heart: Normal rate.  Regular rhythm.    Abdomen: Abdomen is soft.    Extremities: There is no dependent edema.    Neurological: (Patient is awake.  When asked if she knew where she was she did look around somewhat puzzled.  I informed her she was at [facility].  She did cooperate with exam.  She is able to squeeze my finger with the left hand.  She also followed instructions when asked for deep inspirations when I auscultated her lungs.).    Skin:  Warm and dry.       Disposition:  Skilled nursing facility    Patient Instructions:      Discharge Medications      Continue These Medications      Instructions Start Date   amantadine 50 MG/5ML solution  Commonly known as: SYMMETREL   100 mg, Per G Tube, Every 12 Hours      baclofen 20 MG tablet  Commonly " known as: LIORESAL   10 mg, Per G Tube, 4 Times Daily      chlorhexidine 0.12 % solution  Commonly known as: PERIDEX   15 mL, Mouth/Throat, 2 Times Daily      escitalopram 20 MG tablet  Commonly known as: LEXAPRO   20 mg, Per G Tube, Daily      famotidine 20 MG tablet  Commonly known as: PEPCID   20 mg, Per G Tube, Every Morning      insulin aspart 100 UNIT/ML solution pen-injector sc pen  Commonly known as: novoLOG FLEXPEN   0-18 Units, Subcutaneous, 4 Times Daily Before Meals & Nightly, For 150-200 (3 units), 201-250 (6 units), 251-300 (9 units), 301-350 (12 units), 351-400 (15 units), 401-450 (18 units) over 450 call MD       Insulin Glargine 100 UNIT/ML injection pen  Commonly known as: BASAGLAR KWIKPEN   35 Units, Subcutaneous, 2 Times Daily      metFORMIN 1000 MG tablet  Commonly known as: GLUCOPHAGE   1,000 mg, Per G Tube, 2 Times Daily With Meals      methylphenidate 20 MG tablet  Commonly known as: RITALIN   20 mg, Per G Tube, 2 Times Daily      metoprolol tartrate 25 MG tablet  Commonly known as: LOPRESSOR   6.25 mg, Per G Tube, 2 Times Daily      miconazole 2 % powder  Commonly known as: MICOTIN   Topical, 2 times daily, Apply to groin and under bilateral breast       mupirocin 2 % ointment  Commonly known as: BACTROBAN   Topical, 2 Times Daily, Apply to top of right hand and between fingers       Remedy Nutrashield 1 % cream  Generic drug: dimethicone   Topical, 2 Times Daily PRN, Apply to buttock            Diet Instructions     Diet: Tube Feeding; Continuous; Diabetisource AC (Glucerna 1.2); Tube Feeding Type: Continuous; Continuous Tube Feeding Start Rate (mL/hr): 25; Then Advance Rate By (mL/hr): 20; Every __ Hours: 4; To Goal Rate of (mL/hr): 65      Discharge Diet: Tube Feeding    Feeding Type: Continuous    Formula & Rate: Diabetisource AC (Glucerna 1.2); Tube Feeding Type: Continuous; Continuous Tube Feeding Start Rate (mL/hr): 25; Then Advance Rate By (mL/hr): 20; Every __ Hours: 4; To Goal Rate  of (mL/hr): 65        No future appointments.  Additional Instructions for the Follow-ups that You Need to Schedule     Discharge Follow-up with PCP   As directed       Currently Documented PCP:    Germán Freire MD    PCP Phone Number:    [phone]     Follow Up Details: 1 week         Basic Metabolic Panel    Mar 22, 2021 (Approximate)        Follow-up Information     Germán Freire MD .    Specialty: Physical Medicine and Rehabilitation  Why: 1 week  Contact information:  [address]  [facility] [place] [address]  [place] [address]  [phone]                 Discharge Order (From admission, onward)     Start     Ordered    03/17/21 1220  Discharge patient  Once     Expected Discharge Date: 03/17/21    Discharge Disposition: Skilled Nursing Facility (DC - External)    Physician of Record for Attribution - Please select from Treatment Team: SHARIF MANLEY [[id number]]    Review needed by CMO to determine Physician of Record: No       Question Answer Comment   Physician of Record for Attribution - Please select from Treatment Team SHARIF MANLEY    Review needed by CMO to determine Physician of Record No        03/17/21 1223                  Total time spent discharging patient including evaluation,post hospitalization follow up,  medication and post hospitalization instructions and education total time exceeds 30 minutes.    Signed:  Sharif Manley MD  3/17/2021  12:24 EDT    EMR Dragon/Transcription disclaimer:   Much of this encounter note is an electronic transcription/translation of spoken language to printed text. The electronic translation of spoken language may permit erroneous, or at times, nonsensical words or phrases to be inadvertently transcribed; Although I have reviewed the note for such errors, some may still exist.

## 2021-03-17 NOTE — SIGNIFICANT NOTE
03/17/21 1425   OTHER   Discipline speech language pathologist   Rehab Time/Intention   Session Not Performed other (see comments)  (Discussed POC with RN. Patient to DC to SNF this date. SLP and RN in agreement, eval not warranted at this time. Recommend primary SLP at SNF evaluate and advance diet as appropriate. SLP to s/o at this time. Please re-consult was warranted or with changes to POC.)

## 2021-03-17 NOTE — PROGRESS NOTES
"Physicians Statement of Medical Necessity for  Ambulance Transportation    GENERAL INFORMATION     Name: Charu Schroeder  YOB: 1965  Medicare #: [place] MEDICAID #[id number]  Transport Date: 3/17/2021  (Valid for round trips this date, or for scheduled repetitive trips for 60 days from the date signed below.)  Origin: [facility]  Destination: [facility] BRAIN PATHWAYS UNIT  Is the Patient's stay covered under Medicare Part A (PPS/DRG?)No   Closest appropriate facility? Yes  If this a hosp-hosp transfer? No  Is this a hospice patient? No    MEDICAL NECESSITY QUESTIONAIRE    Ambulance Transportation is medically necessary only if other means of transportation are contraindicated or would be potentially harmful to the patient.  To meet this requirement, the patient must be either \"bed confined\" or suffer from a condition such that transport by means other than an ambulance is contraindicated by the patient's condition.  The following questions must be answered by the healthcare professional signing below for this form to be valid:     1) Describe the MEDICAL CONDITION (physical and/or mental) of this patient AT THE TIME OF AMBULANCE TRANSPORT that requires the patient to be transported in an ambulance, and why transport by other means is contraindicated by the patient's condition:   Problems Addressed this Visit        Other    Dehydration    Sepsis, unspecified organism (CMS/HCC) - Primary    Hypernatremia    Relevant Orders    Basic Metabolic Panel      Other Visit Diagnoses     Hyperglycemia        Hx of traumatic brain injury        Hypoxic          Diagnoses       Codes Comments    Sepsis, due to unspecified organism, unspecified whether acute organ dysfunction present (CMS/HCC)    -  Primary ICD-10-CM: A41.9  ICD-9-CM: 038.9, 995.91     Dehydration     ICD-10-CM: E86.0  ICD-9-CM: 276.51     Hyperglycemia     ICD-10-CM: R73.9  ICD-9-CM: 790.29     Hx of " "traumatic brain injury     ICD-10-CM: Z87.820  ICD-9-CM: V15.52     Hypoxic     ICD-10-CM: R09.02  ICD-9-CM: 799.02     Hypernatremia     ICD-10-CM: E87.0  ICD-9-CM: 276.0           Past Medical History:   Diagnosis Date   • Anxiety    • Aphasia    • Diabetes mellitus (CMS/HCC)    • Dysphagia    • Hemiplegia and hemiparesis following nontraumatic subarachnoid hemorrhage affecting right non-dominant side (CMS/HCC)    • Hydrocephalus (CMS/HCC)    • Hyperlipidemia    • Hypertension    • Muscle spasm    • Neurogenic bowel    • Neuromuscular dysfunction of bladder    • Presence of cerebrospinal fluid drainage device    • Respiratory failure (CMS/HCC)    • Restlessness and agitation    • Retention of urine    • Tachycardia    • Traumatic subarachnoid hemorrhage (CMS/HCC)       Past Surgical History:   Procedure Laterality Date   • GTUBE INSERTION     • TRACHEOSTOMY        2) Is this patient \"bed confined\" as defined below?Yes   To be \"bed confined\" the patient must satisfy all three of the following criteria:  (1) unable to get up from bed without assistance; AND (2) unable to ambulate;  AND (3) unable to sit in a chair or wheelchair.  3) Can this patient safely be transported by car or wheelchair van (I.e., may safely sit during transport, without an attendant or monitoring?)No   4. In addition to completing questions 1-3 above, please check any of the following conditions that apply*:          *Note: supporting documentation for any boxes checked must be maintained in the patient's medical records Patient is confused, Medical attendant required, Unable to tolerate seated position for time needed to transport and Other APHASIC, HEMIPLEGIA, TUBE FEEDS, SEVERE WEAKNESS.      SIGNATURE OF PHYSICIAN OR OTHER AUTHORIZED HEALTHCARE PROFESSIONAL    I certify that the above information is true and correct based on my evaluation of this patient, and represent that the patient requires transport by ambulance and that other forms " of transport are contraindicated.  I understand that this information will be used by the Centers for Medicare and Medicaid Services (CMS) to support the determiniation of medical necessity for ambulance services, and I represent that I have personal knowledge of the patient's condition at the time of transport.    X   If this box is checked, I also certify that the patient is physically or mentally incapable of signing the ambulance service's claim form and that the institution with which I am affiliated has furnished care, services or assistance to the patient.  My signature below is made on behalf of the patient pursuant to 42 .36(b)(4). In accordance with 42 .37, the specific reason(s) that the patient is physically or mentally incapable of signing the claim for is as follows: APHASIC    Signature of Physician or Healthcare Professional  Date/Time:   3/17/2021 13:47 EDT      (For Scheduled repetitive transport, this form is not valid for transports performed more than 60 days after this date).                                                                                                                                            -------------------------Angelia Henson RN  -------------------------------------------------------------------  Printed Name and Credentials of Physician or Authorized Healthcare Professional     *Form must be signed by patient's attending physician for scheduled, repetitive transports,.  For non-repetitive ambulance transports, if unable to obtain the signature of the attending physician, any of the following may sign (please select below):     Physician  Clinical Nurse Specialist  Registered Nurse     Physician Assistant  Discharge Planner  Licensed Practical Nurse     Nurse Practitioner

## 2021-03-19 LAB
BACTERIA SPEC AEROBE CULT: NORMAL
BACTERIA SPEC AEROBE CULT: NORMAL

## 2021-04-11 ENCOUNTER — HOSPITAL ENCOUNTER (EMERGENCY)
Facility: HOSPITAL | Age: 56
Discharge: HOME | End: 2021-04-11
Attending: EMERGENCY MEDICINE | Admitting: EMERGENCY MEDICINE

## 2021-04-11 ENCOUNTER — APPOINTMENT (OUTPATIENT)
Dept: GENERAL RADIOLOGY | Facility: HOSPITAL | Age: 56
End: 2021-04-11

## 2021-04-11 VITALS
RESPIRATION RATE: 18 BRPM | HEART RATE: 103 BPM | DIASTOLIC BLOOD PRESSURE: 82 MMHG | TEMPERATURE: 97.7 F | SYSTOLIC BLOOD PRESSURE: 143 MMHG | OXYGEN SATURATION: 95 %

## 2021-04-11 DIAGNOSIS — T85.528A GASTROJEJUNOSTOMY TUBE DISLODGEMENT: Primary | ICD-10-CM

## 2021-04-11 PROCEDURE — 99284 EMERGENCY DEPT VISIT MOD MDM: CPT

## 2021-04-11 PROCEDURE — 74018 RADEX ABDOMEN 1 VIEW: CPT

## 2021-04-11 PROCEDURE — 0 DIATRIZOATE MEGLUMINE & SODIUM PER 1 ML: Performed by: EMERGENCY MEDICINE

## 2021-04-11 PROCEDURE — 99283 EMERGENCY DEPT VISIT LOW MDM: CPT

## 2021-04-11 RX ADMIN — DIATRIZOATE MEGLUMINE AND DIATRIZOATE SODIUM 30 ML: 600; 100 SOLUTION ORAL; RECTAL at 12:41

## 2021-04-11 NOTE — ED NOTES
Report has been called to NH facility. CCP notified for assistance with ambulance ride back to facility.      Olimpia Beckham, RN  04/11/21 6836

## 2021-04-11 NOTE — ED PROVIDER NOTES
Pt presents to the ED c/o dislodged G-tube.  Patient's history is limited secondary to patient's previous stroke.  Reportedly, patient's nurse saw patient this morning and found that the G-tube had dislodged.  She is here for G-tube replacement.     On exam,   Her face is diaphoretic.  Her heart is tachycardic without murmurs.  Her lungs are clear to auscultation bilaterally.  Her abdomen is normoactive bowel sounds, soft, nontender nondistended.  There is a new G-tube in place in the left upper quadrant.     Plan: I agree with G-tube replacement.  Patient's repeat temperature at 1420 is 97.7 °F.  She does not appear to be septic.  Her oxygen level is 95% on room air.  I think it is safe to return patient to the nursing home.  Of note, she had 3 warm blankets on top of her which I have removed.      Patient was placed in face mask in first look. Patient was wearing facemask when I entered the room and throughout our encounter. I wore full protective equipment throughout this patient encounter including a face mask, eye shield and gloves. Hand hygiene was performed before donning protective equipment and after removal when leaving the room.       Attestation:  The TONY and I have discussed this patient's history, physical exam, and treatment plan.  I have reviewed the documentation and personally had a face to face interaction with the patient. I affirm the documentation and agree with the treatment and plan.  The attached note describes my personal findings.            Lencho Puckett MD  04/11/21 1421

## 2021-04-11 NOTE — ED PROVIDER NOTES
EMERGENCY DEPARTMENT ENCOUNTER    Room Number:  06/06  Date seen:  4/16/2021  Time seen: 11:47 EDT  PCP: Germán Freire MD  Historian: Nursing home staff    HPI:  Chief complaint: G-tube replacement  A complete HPI/ROS/PMH/PSH/SH/FH are unobtainable due to: Nonverbal due to history of traumatic subarachnoid hemorrhage  Context:Charu Schroeder is a 55 y.o. female, who presents to the ED with c/o pulling her G-tube out sometime this morning.  Per [facility] postacute nurse, went to see patient 9:30 AM and noticed her G-tube was out.  Unknown how long he was out for.  The G-tube has been pulled out and was brought with her.  It appears to be a size 18 [place]ian.    Patient was placed in face mask in first look. Patient was wearing facemask when I entered the room and throughout our encounter. I wore full protective equipment throughout this patient encounter including a face mask, goggles, and gloves. Hand hygiene was performed before donning protective equipment and after removal when leaving the room.    MEDICAL RECORD REVIEW      ALLERGIES  Patient has no known allergies.    PAST MEDICAL HISTORY  Active Ambulatory Problems     Diagnosis Date Noted   • Dehydration 03/14/2021   • Sepsis, unspecified organism (CMS/[facility]) 03/14/2021   • ZAINAB (acute kidney injury) (CMS/[facility]) 03/14/2021   • Hypernatremia 03/14/2021   • Type 2 diabetes mellitus with hyperglycemia (CMS/[facility]) 03/14/2021   • Hypomagnesemia 03/14/2021   • Hypermagnesemia 03/14/2021   • Hemiplegia and hemiparesis following nontraumatic subarachnoid hemorrhage affecting right dominant side (CMS/[facility]) 03/14/2021   • Essential hypertension 03/14/2021     Resolved Ambulatory Problems     Diagnosis Date Noted   • No Resolved Ambulatory Problems     Past Medical History:   Diagnosis Date   • Anxiety    • Aphasia    • Diabetes mellitus (CMS/[facility])    • Dysphagia    • Hemiplegia and hemiparesis following nontraumatic subarachnoid hemorrhage affecting right non-dominant side  (CMS/HCC)    • Hydrocephalus (CMS/HCC)    • Hyperlipidemia    • Hypertension    • Muscle spasm    • Neurogenic bowel    • Neuromuscular dysfunction of bladder    • Presence of cerebrospinal fluid drainage device    • Respiratory failure (CMS/HCC)    • Restlessness and agitation    • Retention of urine    • Tachycardia    • Traumatic subarachnoid hemorrhage (CMS/HCC)        PAST SURGICAL HISTORY  Past Surgical History:   Procedure Laterality Date   • GTUBE INSERTION     • TRACHEOSTOMY         FAMILY HISTORY  No family history on file.    SOCIAL HISTORY  Social History     Socioeconomic History   • Marital status:      Spouse name: Not on file   • Number of children: Not on file   • Years of education: Not on file   • Highest education level: Not on file   Tobacco Use   • Smoking status: Never Smoker   Substance and Sexual Activity   • Alcohol use: Defer   • Drug use: Defer   • Sexual activity: Defer       REVIEW OF SYSTEMS  Review of Systems   Unable to perform ROS: Patient nonverbal         PHYSICAL EXAM    ED Triage Vitals [04/11/21 1104]   Temp Heart Rate Resp BP SpO2   96.1 °F (35.6 °C) 95 18 138/80 95 %      Temp src Heart Rate Source Patient Position BP Location FiO2 (%)   Tympanic -- -- -- --     Physical Exam    I have reviewed the triage vital signs and nursing notes.      GENERAL: not distressed, nonverbal, nontoxic appearing  HENT: nares patent  EYES: no scleral icterus  NECK: no ROM limitations  CV: regular rhythm, tachycardic rate 105  RESPIRATORY: normal effort; diminished breath sounds to bilateral lower lobes  ABDOMEN: soft, normal bowel sounds, nontender, nondistended; there is a stoma to the left upper quadrant  : deferred  MUSCULOSKELETAL: no deformity  NEURO: alert, moves all extremities, follows commands  SKIN: warm, dry    LAB RESULTS  No results found for this or any previous visit (from the past 24 hour(s)).      RADIOLOGY RESULTS  XR Abdomen KUB   Final Result            PROGRESS,  DATA ANALYSIS, CONSULTS AND MEDICAL DECISION MAKING  All labs have been independently reviewed by me.  All radiology studies have been reviewed by me and discussed with radiologist dictating the report. Discussion below represents my analysis of pertinent findings related to patient's condition, differential diagnosis, treatment plan and final disposition.     ED Course as of Apr 16 2014   Sun Apr 11, 2021   1400 Replaced the G-tube with no complications.  It is confirmed via abdomen KUB.    [SS]      ED Course User Index  [SS] Mary Lyon PA-C       Feeding Tube Replacement    Date/Time: 4/16/2021 8:13 PM  Performed by: Mary Lyon PA-C  Authorized by: Lencho Puckett MD     Consent:     Consent obtained:  Verbal    Consent given by:  Patient  Pre-procedure details:     Old tube type:  Gastrostomy    Old tube size:  18 Fr  Procedure details:     Patient position:  Supine    Procedure type:  Replacement    Tube type:  Gastrostomy    Tube size:  18 Fr  Post-procedure details:     Placement/position confirmation:  X-ray    Placement difficulty:  Minimal    Bleeding:  None    Patient tolerance of procedure:  Tolerated well, no immediate complications           Reviewed pt's history and workup with Dr. Puckett.  After a bedside evaluation, Dr. Puckett agrees with the plan of care.    (FOR DISCHARGE)The patient's history, physical exam, and lab findings were discussed with the physician, who also performed a face to face history and physical exam.  I discussed all results and noted any abnormalities with patient.  Discussed absoute need to recheck abnormalities with their family physician.  I answered any of the patient's questions.  Discussed plan for discharge, as there is no emergent indication for admission.  Pt is agreeable and understands need for follow up and repeat testing.  Pt is aware that discharge does not mean that nothing is wrong but it indicates no emergency is present and they must continue care  with their family physician.  Pt is discharged with instructions to follow up with primary care doctor to have their blood pressure rechecked.           Disposition vitals:  /82   Pulse 103   Temp 97.7 °F (36.5 °C) (Tympanic)   Resp 18   SpO2 95%       DIAGNOSIS  Final diagnoses:   Gastrojejunostomy tube dislodgement       FOLLOW UP   Germán Freire MD  [address]  [facility] [address] [phone]          [facility] Emergency Department  [address] [place] [address] [phone]    As needed, If symptoms worsen         Mary Lyon PA-C  04/11/21 1624       Mary Lyon PA-C  04/16/21 2014
